# Patient Record
Sex: MALE | Race: WHITE | NOT HISPANIC OR LATINO | URBAN - METROPOLITAN AREA
[De-identification: names, ages, dates, MRNs, and addresses within clinical notes are randomized per-mention and may not be internally consistent; named-entity substitution may affect disease eponyms.]

---

## 2019-01-15 ENCOUNTER — HOSPITAL ENCOUNTER (OUTPATIENT)
Dept: RADIOLOGY | Facility: MEDICAL CENTER | Age: 74
DRG: 517 | End: 2019-01-15
Attending: NEUROLOGICAL SURGERY | Admitting: NEUROLOGICAL SURGERY
Payer: COMMERCIAL

## 2019-01-15 DIAGNOSIS — Z01.810 PRE-OPERATIVE CARDIOVASCULAR EXAMINATION: ICD-10-CM

## 2019-01-15 DIAGNOSIS — Z01.812 PRE-OPERATIVE LABORATORY EXAMINATION: ICD-10-CM

## 2019-01-15 DIAGNOSIS — Z01.811 PRE-OPERATIVE RESPIRATORY EXAMINATION: ICD-10-CM

## 2019-01-15 LAB
ANION GAP SERPL CALC-SCNC: 11 MMOL/L (ref 0–11.9)
APPEARANCE UR: CLEAR
APTT PPP: 33 SEC (ref 24.7–36)
BASOPHILS # BLD AUTO: 0.6 % (ref 0–1.8)
BASOPHILS # BLD: 0.04 K/UL (ref 0–0.12)
BILIRUB UR QL STRIP.AUTO: NEGATIVE
BUN SERPL-MCNC: 14 MG/DL (ref 8–22)
CALCIUM SERPL-MCNC: 10.2 MG/DL (ref 8.5–10.5)
CHLORIDE SERPL-SCNC: 104 MMOL/L (ref 96–112)
CO2 SERPL-SCNC: 24 MMOL/L (ref 20–33)
COLOR UR: YELLOW
CREAT SERPL-MCNC: 0.88 MG/DL (ref 0.5–1.4)
EKG IMPRESSION: NORMAL
EOSINOPHIL # BLD AUTO: 0.06 K/UL (ref 0–0.51)
EOSINOPHIL NFR BLD: 0.9 % (ref 0–6.9)
ERYTHROCYTE [DISTWIDTH] IN BLOOD BY AUTOMATED COUNT: 43 FL (ref 35.9–50)
GLUCOSE SERPL-MCNC: 93 MG/DL (ref 65–99)
GLUCOSE UR STRIP.AUTO-MCNC: NEGATIVE MG/DL
HCT VFR BLD AUTO: 51 % (ref 42–52)
HGB BLD-MCNC: 17.3 G/DL (ref 14–18)
IMM GRANULOCYTES # BLD AUTO: 0.02 K/UL (ref 0–0.11)
IMM GRANULOCYTES NFR BLD AUTO: 0.3 % (ref 0–0.9)
INR PPP: 0.93 (ref 0.87–1.13)
KETONES UR STRIP.AUTO-MCNC: NEGATIVE MG/DL
LEUKOCYTE ESTERASE UR QL STRIP.AUTO: NEGATIVE
LYMPHOCYTES # BLD AUTO: 1.61 K/UL (ref 1–4.8)
LYMPHOCYTES NFR BLD: 23.1 % (ref 22–41)
MCH RBC QN AUTO: 32.6 PG (ref 27–33)
MCHC RBC AUTO-ENTMCNC: 33.9 G/DL (ref 33.7–35.3)
MCV RBC AUTO: 96 FL (ref 81.4–97.8)
MICRO URNS: NORMAL
MONOCYTES # BLD AUTO: 0.71 K/UL (ref 0–0.85)
MONOCYTES NFR BLD AUTO: 10.2 % (ref 0–13.4)
NEUTROPHILS # BLD AUTO: 4.53 K/UL (ref 1.82–7.42)
NEUTROPHILS NFR BLD: 64.9 % (ref 44–72)
NITRITE UR QL STRIP.AUTO: NEGATIVE
NRBC # BLD AUTO: 0 K/UL
NRBC BLD-RTO: 0 /100 WBC
PH UR STRIP.AUTO: 7 [PH]
PLATELET # BLD AUTO: 181 K/UL (ref 164–446)
PMV BLD AUTO: 10.4 FL (ref 9–12.9)
POTASSIUM SERPL-SCNC: 4.2 MMOL/L (ref 3.6–5.5)
PROT UR QL STRIP: NEGATIVE MG/DL
PROTHROMBIN TIME: 12.6 SEC (ref 12–14.6)
RBC # BLD AUTO: 5.31 M/UL (ref 4.7–6.1)
RBC UR QL AUTO: NEGATIVE
SODIUM SERPL-SCNC: 139 MMOL/L (ref 135–145)
SP GR UR STRIP.AUTO: 1.02
UROBILINOGEN UR STRIP.AUTO-MCNC: 0.2 MG/DL
WBC # BLD AUTO: 7 K/UL (ref 4.8–10.8)

## 2019-01-15 PROCEDURE — 85025 COMPLETE CBC W/AUTO DIFF WBC: CPT

## 2019-01-15 PROCEDURE — 85730 THROMBOPLASTIN TIME PARTIAL: CPT

## 2019-01-15 PROCEDURE — 71045 X-RAY EXAM CHEST 1 VIEW: CPT

## 2019-01-15 PROCEDURE — 81003 URINALYSIS AUTO W/O SCOPE: CPT

## 2019-01-15 PROCEDURE — 93005 ELECTROCARDIOGRAM TRACING: CPT

## 2019-01-15 PROCEDURE — 36415 COLL VENOUS BLD VENIPUNCTURE: CPT

## 2019-01-15 PROCEDURE — 93010 ELECTROCARDIOGRAM REPORT: CPT | Performed by: INTERNAL MEDICINE

## 2019-01-15 PROCEDURE — 80048 BASIC METABOLIC PNL TOTAL CA: CPT

## 2019-01-15 PROCEDURE — 85610 PROTHROMBIN TIME: CPT

## 2019-01-15 RX ORDER — ATENOLOL 25 MG/1
25 TABLET ORAL DAILY
COMMUNITY

## 2019-01-15 RX ORDER — IBUPROFEN 800 MG/1
800 TABLET ORAL EVERY 8 HOURS PRN
Status: ON HOLD | COMMUNITY
End: 2019-01-27

## 2019-01-15 RX ORDER — PREDNISONE 5 MG/1
5 TABLET ORAL DAILY
COMMUNITY

## 2019-01-22 ENCOUNTER — APPOINTMENT (OUTPATIENT)
Dept: RADIOLOGY | Facility: MEDICAL CENTER | Age: 74
DRG: 517 | End: 2019-01-22
Attending: NEUROLOGICAL SURGERY
Payer: COMMERCIAL

## 2019-01-22 ENCOUNTER — HOSPITAL ENCOUNTER (INPATIENT)
Facility: MEDICAL CENTER | Age: 74
LOS: 5 days | DRG: 517 | End: 2019-01-27
Attending: NEUROLOGICAL SURGERY | Admitting: NEUROLOGICAL SURGERY
Payer: COMMERCIAL

## 2019-01-22 DIAGNOSIS — M54.5 BILATERAL LOW BACK PAIN, UNSPECIFIED CHRONICITY, WITH SCIATICA PRESENCE UNSPECIFIED: ICD-10-CM

## 2019-01-22 PROCEDURE — 160002 HCHG RECOVERY MINUTES (STAT): Performed by: NEUROLOGICAL SURGERY

## 2019-01-22 PROCEDURE — 72020 X-RAY EXAM OF SPINE 1 VIEW: CPT

## 2019-01-22 PROCEDURE — 700102 HCHG RX REV CODE 250 W/ 637 OVERRIDE(OP): Performed by: PHYSICIAN ASSISTANT

## 2019-01-22 PROCEDURE — 700101 HCHG RX REV CODE 250

## 2019-01-22 PROCEDURE — 700102 HCHG RX REV CODE 250 W/ 637 OVERRIDE(OP): Performed by: ANESTHESIOLOGY

## 2019-01-22 PROCEDURE — 110454 HCHG SHELL REV 250: Performed by: NEUROLOGICAL SURGERY

## 2019-01-22 PROCEDURE — 160048 HCHG OR STATISTICAL LEVEL 1-5: Performed by: NEUROLOGICAL SURGERY

## 2019-01-22 PROCEDURE — 770001 HCHG ROOM/CARE - MED/SURG/GYN PRIV*

## 2019-01-22 PROCEDURE — 160029 HCHG SURGERY MINUTES - 1ST 30 MINS LEVEL 4: Performed by: NEUROLOGICAL SURGERY

## 2019-01-22 PROCEDURE — 95955 EEG DURING SURGERY: CPT | Performed by: NEUROLOGICAL SURGERY

## 2019-01-22 PROCEDURE — 501838 HCHG SUTURE GENERAL: Performed by: NEUROLOGICAL SURGERY

## 2019-01-22 PROCEDURE — 700112 HCHG RX REV CODE 229: Performed by: PHYSICIAN ASSISTANT

## 2019-01-22 PROCEDURE — 4A1134G MONITORING OF PERIPHERAL NERVOUS ELECTRICAL ACTIVITY, INTRAOPERATIVE, PERCUTANEOUS APPROACH: ICD-10-PCS | Performed by: NEUROLOGICAL SURGERY

## 2019-01-22 PROCEDURE — 95861 NEEDLE EMG 2 EXTREMITIES: CPT | Performed by: NEUROLOGICAL SURGERY

## 2019-01-22 PROCEDURE — 95940 IONM IN OPERATNG ROOM 15 MIN: CPT | Performed by: NEUROLOGICAL SURGERY

## 2019-01-22 PROCEDURE — A9270 NON-COVERED ITEM OR SERVICE: HCPCS | Performed by: PHYSICIAN ASSISTANT

## 2019-01-22 PROCEDURE — 700101 HCHG RX REV CODE 250: Performed by: PHYSICIAN ASSISTANT

## 2019-01-22 PROCEDURE — 160035 HCHG PACU - 1ST 60 MINS PHASE I: Performed by: NEUROLOGICAL SURGERY

## 2019-01-22 PROCEDURE — 01NB0ZZ RELEASE LUMBAR NERVE, OPEN APPROACH: ICD-10-PCS | Performed by: NEUROLOGICAL SURGERY

## 2019-01-22 PROCEDURE — 700111 HCHG RX REV CODE 636 W/ 250 OVERRIDE (IP): Performed by: PHYSICIAN ASSISTANT

## 2019-01-22 PROCEDURE — 95925 SOMATOSENSORY TESTING: CPT | Performed by: NEUROLOGICAL SURGERY

## 2019-01-22 PROCEDURE — 160009 HCHG ANES TIME/MIN: Performed by: NEUROLOGICAL SURGERY

## 2019-01-22 PROCEDURE — A9270 NON-COVERED ITEM OR SERVICE: HCPCS | Performed by: ANESTHESIOLOGY

## 2019-01-22 PROCEDURE — 500367 HCHG DRAIN KIT, HEMOVAC: Performed by: NEUROLOGICAL SURGERY

## 2019-01-22 PROCEDURE — 95937 NEUROMUSCULAR JUNCTION TEST: CPT | Performed by: NEUROLOGICAL SURGERY

## 2019-01-22 PROCEDURE — 160036 HCHG PACU - EA ADDL 30 MINS PHASE I: Performed by: NEUROLOGICAL SURGERY

## 2019-01-22 PROCEDURE — 700111 HCHG RX REV CODE 636 W/ 250 OVERRIDE (IP)

## 2019-01-22 PROCEDURE — 160041 HCHG SURGERY MINUTES - EA ADDL 1 MIN LEVEL 4: Performed by: NEUROLOGICAL SURGERY

## 2019-01-22 PROCEDURE — 500885 HCHG PACK, JACKSON TABLE: Performed by: NEUROLOGICAL SURGERY

## 2019-01-22 RX ORDER — DIPHENHYDRAMINE HYDROCHLORIDE 50 MG/ML
12.5 INJECTION INTRAMUSCULAR; INTRAVENOUS
Status: DISCONTINUED | OUTPATIENT
Start: 2019-01-22 | End: 2019-01-22 | Stop reason: HOSPADM

## 2019-01-22 RX ORDER — DEXTROSE MONOHYDRATE, SODIUM CHLORIDE, AND POTASSIUM CHLORIDE 50; 1.49; 4.5 G/1000ML; G/1000ML; G/1000ML
INJECTION, SOLUTION INTRAVENOUS CONTINUOUS
Status: DISCONTINUED | OUTPATIENT
Start: 2019-01-22 | End: 2019-01-27 | Stop reason: HOSPADM

## 2019-01-22 RX ORDER — DIPHENHYDRAMINE HYDROCHLORIDE 50 MG/ML
25 INJECTION INTRAMUSCULAR; INTRAVENOUS EVERY 6 HOURS PRN
Status: DISCONTINUED | OUTPATIENT
Start: 2019-01-22 | End: 2019-01-27 | Stop reason: HOSPADM

## 2019-01-22 RX ORDER — ONDANSETRON 2 MG/ML
4 INJECTION INTRAMUSCULAR; INTRAVENOUS EVERY 4 HOURS PRN
Status: DISCONTINUED | OUTPATIENT
Start: 2019-01-22 | End: 2019-01-27 | Stop reason: HOSPADM

## 2019-01-22 RX ORDER — HYDROMORPHONE HYDROCHLORIDE 1 MG/ML
0.2 INJECTION, SOLUTION INTRAMUSCULAR; INTRAVENOUS; SUBCUTANEOUS
Status: DISCONTINUED | OUTPATIENT
Start: 2019-01-22 | End: 2019-01-22 | Stop reason: HOSPADM

## 2019-01-22 RX ORDER — HYDRALAZINE HYDROCHLORIDE 20 MG/ML
5 INJECTION INTRAMUSCULAR; INTRAVENOUS
Status: DISCONTINUED | OUTPATIENT
Start: 2019-01-22 | End: 2019-01-22 | Stop reason: HOSPADM

## 2019-01-22 RX ORDER — ATENOLOL 25 MG/1
25 TABLET ORAL DAILY
Status: DISCONTINUED | OUTPATIENT
Start: 2019-01-23 | End: 2019-01-27 | Stop reason: HOSPADM

## 2019-01-22 RX ORDER — DIPHENHYDRAMINE HCL 25 MG
25 TABLET ORAL EVERY 6 HOURS PRN
Status: DISCONTINUED | OUTPATIENT
Start: 2019-01-22 | End: 2019-01-27 | Stop reason: HOSPADM

## 2019-01-22 RX ORDER — HYDROMORPHONE HYDROCHLORIDE 1 MG/ML
0.4 INJECTION, SOLUTION INTRAMUSCULAR; INTRAVENOUS; SUBCUTANEOUS
Status: DISCONTINUED | OUTPATIENT
Start: 2019-01-22 | End: 2019-01-22 | Stop reason: HOSPADM

## 2019-01-22 RX ORDER — PREDNISONE 5 MG/1
5 TABLET ORAL DAILY
Status: DISCONTINUED | OUTPATIENT
Start: 2019-01-23 | End: 2019-01-27 | Stop reason: HOSPADM

## 2019-01-22 RX ORDER — SODIUM CHLORIDE, SODIUM LACTATE, POTASSIUM CHLORIDE, CALCIUM CHLORIDE 600; 310; 30; 20 MG/100ML; MG/100ML; MG/100ML; MG/100ML
INJECTION, SOLUTION INTRAVENOUS CONTINUOUS
Status: DISCONTINUED | OUTPATIENT
Start: 2019-01-22 | End: 2019-01-22 | Stop reason: HOSPADM

## 2019-01-22 RX ORDER — SODIUM CHLORIDE, SODIUM LACTATE, POTASSIUM CHLORIDE, CALCIUM CHLORIDE 600; 310; 30; 20 MG/100ML; MG/100ML; MG/100ML; MG/100ML
INJECTION, SOLUTION INTRAVENOUS ONCE
Status: COMPLETED | OUTPATIENT
Start: 2019-01-22 | End: 2019-01-22

## 2019-01-22 RX ORDER — HYDROMORPHONE HYDROCHLORIDE 1 MG/ML
0.1 INJECTION, SOLUTION INTRAMUSCULAR; INTRAVENOUS; SUBCUTANEOUS
Status: DISCONTINUED | OUTPATIENT
Start: 2019-01-22 | End: 2019-01-22 | Stop reason: HOSPADM

## 2019-01-22 RX ORDER — BUPIVACAINE HYDROCHLORIDE AND EPINEPHRINE 5; 5 MG/ML; UG/ML
INJECTION, SOLUTION EPIDURAL; INTRACAUDAL; PERINEURAL
Status: DISCONTINUED | OUTPATIENT
Start: 2019-01-22 | End: 2019-01-22 | Stop reason: HOSPADM

## 2019-01-22 RX ORDER — POLYETHYLENE GLYCOL 3350 17 G/17G
1 POWDER, FOR SOLUTION ORAL 2 TIMES DAILY PRN
Status: DISCONTINUED | OUTPATIENT
Start: 2019-01-22 | End: 2019-01-27 | Stop reason: HOSPADM

## 2019-01-22 RX ORDER — ONDANSETRON 4 MG/1
4 TABLET, ORALLY DISINTEGRATING ORAL EVERY 4 HOURS PRN
Status: DISCONTINUED | OUTPATIENT
Start: 2019-01-22 | End: 2019-01-27 | Stop reason: HOSPADM

## 2019-01-22 RX ORDER — HYDROMORPHONE HYDROCHLORIDE 2 MG/ML
1 INJECTION, SOLUTION INTRAMUSCULAR; INTRAVENOUS; SUBCUTANEOUS
Status: DISCONTINUED | OUTPATIENT
Start: 2019-01-22 | End: 2019-01-27 | Stop reason: HOSPADM

## 2019-01-22 RX ORDER — OXYCODONE HCL 5 MG/5 ML
10 SOLUTION, ORAL ORAL
Status: DISCONTINUED | OUTPATIENT
Start: 2019-01-22 | End: 2019-01-22 | Stop reason: HOSPADM

## 2019-01-22 RX ORDER — OXYCODONE HCL 5 MG/5 ML
5 SOLUTION, ORAL ORAL
Status: DISCONTINUED | OUTPATIENT
Start: 2019-01-22 | End: 2019-01-22 | Stop reason: HOSPADM

## 2019-01-22 RX ORDER — ONDANSETRON 2 MG/ML
4 INJECTION INTRAMUSCULAR; INTRAVENOUS
Status: DISCONTINUED | OUTPATIENT
Start: 2019-01-22 | End: 2019-01-22 | Stop reason: HOSPADM

## 2019-01-22 RX ORDER — CELECOXIB 200 MG/1
200 CAPSULE ORAL ONCE
Status: COMPLETED | OUTPATIENT
Start: 2019-01-22 | End: 2019-01-22

## 2019-01-22 RX ORDER — ACETAMINOPHEN 500 MG
1000 TABLET ORAL ONCE
Status: COMPLETED | OUTPATIENT
Start: 2019-01-22 | End: 2019-01-22

## 2019-01-22 RX ORDER — AMOXICILLIN 250 MG
1 CAPSULE ORAL
Status: DISCONTINUED | OUTPATIENT
Start: 2019-01-22 | End: 2019-01-27 | Stop reason: HOSPADM

## 2019-01-22 RX ORDER — AMOXICILLIN 250 MG
1 CAPSULE ORAL NIGHTLY
Status: DISCONTINUED | OUTPATIENT
Start: 2019-01-22 | End: 2019-01-27 | Stop reason: HOSPADM

## 2019-01-22 RX ORDER — OXYCODONE HYDROCHLORIDE AND ACETAMINOPHEN 5; 325 MG/1; MG/1
1-2 TABLET ORAL EVERY 4 HOURS PRN
Status: DISCONTINUED | OUTPATIENT
Start: 2019-01-22 | End: 2019-01-27 | Stop reason: HOSPADM

## 2019-01-22 RX ORDER — CYCLOBENZAPRINE HCL 10 MG
10 TABLET ORAL EVERY 8 HOURS PRN
Status: DISCONTINUED | OUTPATIENT
Start: 2019-01-22 | End: 2019-01-23

## 2019-01-22 RX ORDER — SODIUM CHLORIDE, SODIUM LACTATE, POTASSIUM CHLORIDE, AND CALCIUM CHLORIDE .6; .31; .03; .02 G/100ML; G/100ML; G/100ML; G/100ML
IRRIGANT IRRIGATION
Status: DISCONTINUED | OUTPATIENT
Start: 2019-01-22 | End: 2019-01-22 | Stop reason: HOSPADM

## 2019-01-22 RX ORDER — MEPERIDINE HYDROCHLORIDE 25 MG/ML
6.25 INJECTION INTRAMUSCULAR; INTRAVENOUS; SUBCUTANEOUS
Status: DISCONTINUED | OUTPATIENT
Start: 2019-01-22 | End: 2019-01-22 | Stop reason: HOSPADM

## 2019-01-22 RX ORDER — CEFAZOLIN SODIUM 2 G/100ML
2 INJECTION, SOLUTION INTRAVENOUS EVERY 8 HOURS
Status: COMPLETED | OUTPATIENT
Start: 2019-01-22 | End: 2019-01-23

## 2019-01-22 RX ORDER — HALOPERIDOL 5 MG/ML
1 INJECTION INTRAMUSCULAR
Status: DISCONTINUED | OUTPATIENT
Start: 2019-01-22 | End: 2019-01-22 | Stop reason: HOSPADM

## 2019-01-22 RX ORDER — HYDRALAZINE HYDROCHLORIDE 20 MG/ML
10 INJECTION INTRAMUSCULAR; INTRAVENOUS
Status: DISCONTINUED | OUTPATIENT
Start: 2019-01-22 | End: 2019-01-27 | Stop reason: HOSPADM

## 2019-01-22 RX ORDER — ENEMA 19; 7 G/133ML; G/133ML
1 ENEMA RECTAL
Status: DISCONTINUED | OUTPATIENT
Start: 2019-01-22 | End: 2019-01-27 | Stop reason: HOSPADM

## 2019-01-22 RX ORDER — ALPRAZOLAM 0.25 MG/1
0.25 TABLET ORAL 2 TIMES DAILY PRN
Status: DISCONTINUED | OUTPATIENT
Start: 2019-01-22 | End: 2019-01-27 | Stop reason: HOSPADM

## 2019-01-22 RX ORDER — HYDROCODONE BITARTRATE AND ACETAMINOPHEN 10; 325 MG/1; MG/1
1-2 TABLET ORAL EVERY 4 HOURS PRN
Status: DISCONTINUED | OUTPATIENT
Start: 2019-01-22 | End: 2019-01-27 | Stop reason: HOSPADM

## 2019-01-22 RX ORDER — CALCIUM CARBONATE 500 MG/1
500 TABLET, CHEWABLE ORAL 2 TIMES DAILY
Status: DISCONTINUED | OUTPATIENT
Start: 2019-01-22 | End: 2019-01-27 | Stop reason: HOSPADM

## 2019-01-22 RX ORDER — DOCUSATE SODIUM 100 MG/1
100 CAPSULE, LIQUID FILLED ORAL 2 TIMES DAILY
Status: DISCONTINUED | OUTPATIENT
Start: 2019-01-22 | End: 2019-01-27 | Stop reason: HOSPADM

## 2019-01-22 RX ORDER — BISACODYL 10 MG
10 SUPPOSITORY, RECTAL RECTAL
Status: DISCONTINUED | OUTPATIENT
Start: 2019-01-22 | End: 2019-01-27 | Stop reason: HOSPADM

## 2019-01-22 RX ADMIN — ACETAMINOPHEN 1000 MG: 500 TABLET, FILM COATED ORAL at 10:21

## 2019-01-22 RX ADMIN — ANTACID TABLETS 500 MG: 500 TABLET, CHEWABLE ORAL at 17:38

## 2019-01-22 RX ADMIN — CEFAZOLIN SODIUM 2 G: 2 INJECTION, SOLUTION INTRAVENOUS at 17:37

## 2019-01-22 RX ADMIN — POTASSIUM CHLORIDE, DEXTROSE MONOHYDRATE AND SODIUM CHLORIDE: 150; 5; 450 INJECTION, SOLUTION INTRAVENOUS at 17:37

## 2019-01-22 RX ADMIN — CELECOXIB 200 MG: 200 CAPSULE ORAL at 10:21

## 2019-01-22 RX ADMIN — SODIUM CHLORIDE, SODIUM LACTATE, POTASSIUM CHLORIDE, CALCIUM CHLORIDE: 600; 310; 30; 20 INJECTION, SOLUTION INTRAVENOUS at 15:19

## 2019-01-22 RX ADMIN — Medication 1 TABLET: at 21:40

## 2019-01-22 RX ADMIN — SODIUM CHLORIDE, SODIUM LACTATE, POTASSIUM CHLORIDE, CALCIUM CHLORIDE: 600; 310; 30; 20 INJECTION, SOLUTION INTRAVENOUS at 10:21

## 2019-01-22 RX ADMIN — DOCUSATE SODIUM 100 MG: 100 CAPSULE, LIQUID FILLED ORAL at 17:37

## 2019-01-22 ASSESSMENT — PAIN SCALES - GENERAL
PAINLEVEL_OUTOF10: 0
PAINLEVEL_OUTOF10: 0
PAINLEVEL_OUTOF10: 4
PAINLEVEL_OUTOF10: 0

## 2019-01-22 ASSESSMENT — PATIENT HEALTH QUESTIONNAIRE - PHQ9
1. LITTLE INTEREST OR PLEASURE IN DOING THINGS: NOT AT ALL
SUM OF ALL RESPONSES TO PHQ9 QUESTIONS 1 AND 2: 0
2. FEELING DOWN, DEPRESSED, IRRITABLE, OR HOPELESS: NOT AT ALL

## 2019-01-22 NOTE — OP REPORT
DATE OF SERVICE:  01/22/2019    PREOPERATIVE DIAGNOSES:  1.  L2-S1 lumbar stenosis with bilateral recess stenosis.  2.  Neurogenic claudication.  3.  Failed conservative care.    POSTOPERATIVE DIAGNOSES:  1.  L2-S1 lumbar stenosis with bilateral recess stenosis.  2.  Neurogenic claudication.  3.  Failed conservative care.    PROCEDURES:  1.  L2 through S1 decompressive lumbar laminectomies with bilateral   foraminotomies.  2.  Left L4 transpedicular approach far lateral decompression, left L4 nerve   root.  3.  Left L5 transpedicular approach far lateral decompression, left L5 nerve   root.  4.  Microscope for microdissection of spinal canal.  5.  SSEPs and EMG monitoring performed by [facility], which   remained stable throughout.    SURGEON:  Terrence Gabriel MD, neurosurgery-spine surgery    ASSISTANT:  Maximus Winslow PA-C    ANESTHESIA:  General endotracheal anesthesia.    ANESTHESIOLOGIST:  Nemesio Bullock MD    COMPLICATIONS:  None.    ESTIMATED BLOOD LOSS:  Less than 100 mL    PREOPERATIVE NOTE:  This is a very pleasant 74-year-old male who presents with   neurogenic claudication.  An MRI showed severe lumbar stenosis L2 to L5-S1   levels with marked facet and ligamentous hypertrophy.  Given the patient's   failure to improve with conservative care, I have offered the patient the   above-listed procedure.  Details are well contained in the office visit notes.    NARRATIVE DICTATION:  Patient was brought to the operating room and placed   under general endotracheal anesthesia.  He was placed prone on the operating   OSI table with care taken about the bony prominences and peripheral nerves.    Lumbar region was prepped and draped in usual sterile fashion.  Following   localization with cross table fluoroscopy, a midline incision was made from   L2-L5 and the dorsal elements of L2-L5 were exposed in a subperiosteal fashion   at the facets bilaterally.  Self-retaining retraction applied.     Intraoperative x-ray confirmed appropriate levels.  Using a Midas Baltazar AM-8   drillbit, Kerrison rongeurs and curettes, decompressive lumbar laminectomies   of L2, L3, L4, L5, and S1 were completed.  Marked facet and ligamentous   hypertrophy was undercut and removed.  There was severe stenosis at L4-L5,   which was carefully microdissected off the dura and undercut and removed.  The   microscope was used for microdissection of spinal canal throughout.  There   was severe stenosis on the left side; hence, I drilled down the left L4   pedicle and the left L5 pedicle for transpedicular decompression of left L4   and L5 nerve roots respectively.  This required extra degree of expertise,   effort and time; hence, modifier-59 was required for separate and distinct   procedures for CPT code 79764-36535.  The edges of bone were bone waxed.    Thrombin Gelfoam placed in epidural gutters for hemostasis.  Del Rosario ball hook   was easily placed over the exiting nerve roots.    The wound was irrigated and then I placed an epidural catheter with Marcaine   and fentanyl for postoperative analgesia.  I infiltrated the muscle with   Marcaine analgesia and closed the wound over a drain, brought out through a   separate incision with 0 Vicryl to deep fascia, 2-0 Vicryl to deep dermal   layer, Steri-Strips to skin.  Small sterile dressing was applied.  Swabs,   needles, instruments correct x2 count.  No complications were encountered.    Patient tolerated the procedure, was stable and transferred to recovery room.    Patient will be observed at [facility] until he meets   discharge criteria over the next several days.    INTRAOPERATIVE FINDINGS:  Severe stenosis L2 to L5-S1 levels with marked facet   and ligamentous hypertrophy and severe stenosis at L4-L5.  This was undercut   and removed extensively to free up the L2-L5 nerve roots throughout.  The   patient was stable in recovery room.    Patient will follow up  at [facility] as instructed.       ____________________________________     MD TAYLOR JOINER / SMILEY    DD:  01/22/2019 14:22:09  DT:  01/22/2019 14:37:15    D#:  [id number]  Job#:  [id number]

## 2019-01-22 NOTE — OR SURGEON
Immediate Post OP Note    PreOp Diagnosis: L2-5 DDD, radiculopathy.     PostOp Diagnosis: Same.     Procedure(s):  LUMBAR LAMINECTOMY DISKECTOMY- L2-5 - Wound Class: Clean with Drain    Surgeon(s):  Terrence Gabriel M.D.    Anesthesiologist/Type of Anesthesia:  Anesthesiologist: Nemesio Bullock M.D./General    Surgical Staff:  Assistant: Maximus Winslow P.A.-C.  Circulator: Mildred Larios R.N.  Relief Circulator: Ana Jimenez R.N.  Relief Scrub: Ingrid Hammonds  Scrub Person: Quan Shi  Radiology Technologist: Luanne Don    Specimens removed if any:  * No specimens in log *    Estimated Blood Loss: <100 cc     Findings: L2-5 DDD, see dictation.     Complications: None.          1/22/2019 1:45 PM Maximus Winslow P.A.-C.

## 2019-01-22 NOTE — OR NURSING
VSS.  Oximetry WNL on 2L nasal cannula.  Lumbar dressing with mild amount of sanguinous drainage.  Hemovac with mild bloody output.  Neurologically intact.  ALTAMIRANO strong = bilat.  Denies numbness / tingling.  Awaiting ready room

## 2019-01-23 LAB
ANION GAP SERPL CALC-SCNC: 9 MMOL/L (ref 0–11.9)
BUN SERPL-MCNC: 13 MG/DL (ref 8–22)
CALCIUM SERPL-MCNC: 8.8 MG/DL (ref 8.5–10.5)
CHLORIDE SERPL-SCNC: 104 MMOL/L (ref 96–112)
CO2 SERPL-SCNC: 25 MMOL/L (ref 20–33)
CREAT SERPL-MCNC: 0.85 MG/DL (ref 0.5–1.4)
ERYTHROCYTE [DISTWIDTH] IN BLOOD BY AUTOMATED COUNT: 43.5 FL (ref 35.9–50)
GLUCOSE SERPL-MCNC: 154 MG/DL (ref 65–99)
HCT VFR BLD AUTO: 41.8 % (ref 42–52)
HGB BLD-MCNC: 13.8 G/DL (ref 14–18)
MCH RBC QN AUTO: 32.5 PG (ref 27–33)
MCHC RBC AUTO-ENTMCNC: 33 G/DL (ref 33.7–35.3)
MCV RBC AUTO: 98.4 FL (ref 81.4–97.8)
PLATELET # BLD AUTO: 186 K/UL (ref 164–446)
PMV BLD AUTO: 10.6 FL (ref 9–12.9)
POTASSIUM SERPL-SCNC: 4.8 MMOL/L (ref 3.6–5.5)
RBC # BLD AUTO: 4.25 M/UL (ref 4.7–6.1)
SODIUM SERPL-SCNC: 138 MMOL/L (ref 135–145)
WBC # BLD AUTO: 10.8 K/UL (ref 4.8–10.8)

## 2019-01-23 PROCEDURE — 700111 HCHG RX REV CODE 636 W/ 250 OVERRIDE (IP): Performed by: PHYSICIAN ASSISTANT

## 2019-01-23 PROCEDURE — 36415 COLL VENOUS BLD VENIPUNCTURE: CPT

## 2019-01-23 PROCEDURE — 97161 PT EVAL LOW COMPLEX 20 MIN: CPT

## 2019-01-23 PROCEDURE — A9270 NON-COVERED ITEM OR SERVICE: HCPCS | Performed by: PHYSICIAN ASSISTANT

## 2019-01-23 PROCEDURE — 700101 HCHG RX REV CODE 250: Performed by: PHYSICIAN ASSISTANT

## 2019-01-23 PROCEDURE — 700102 HCHG RX REV CODE 250 W/ 637 OVERRIDE(OP): Performed by: PHYSICIAN ASSISTANT

## 2019-01-23 PROCEDURE — 700112 HCHG RX REV CODE 229: Performed by: PHYSICIAN ASSISTANT

## 2019-01-23 PROCEDURE — 770001 HCHG ROOM/CARE - MED/SURG/GYN PRIV*

## 2019-01-23 PROCEDURE — 80048 BASIC METABOLIC PNL TOTAL CA: CPT

## 2019-01-23 PROCEDURE — 85027 COMPLETE CBC AUTOMATED: CPT

## 2019-01-23 PROCEDURE — 97165 OT EVAL LOW COMPLEX 30 MIN: CPT

## 2019-01-23 RX ORDER — BUTALBITAL, ACETAMINOPHEN AND CAFFEINE 50; 325; 40 MG/1; MG/1; MG/1
1 TABLET ORAL EVERY 6 HOURS PRN
Status: DISCONTINUED | OUTPATIENT
Start: 2019-01-23 | End: 2019-01-27 | Stop reason: HOSPADM

## 2019-01-23 RX ORDER — ACETAMINOPHEN 500 MG
500 TABLET ORAL EVERY 4 HOURS PRN
Status: DISCONTINUED | OUTPATIENT
Start: 2019-01-23 | End: 2019-01-27 | Stop reason: HOSPADM

## 2019-01-23 RX ORDER — CYCLOBENZAPRINE HCL 10 MG
5 TABLET ORAL EVERY 8 HOURS PRN
Status: DISCONTINUED | OUTPATIENT
Start: 2019-01-23 | End: 2019-01-27 | Stop reason: HOSPADM

## 2019-01-23 RX ORDER — SCOLOPAMINE TRANSDERMAL SYSTEM 1 MG/1
1 PATCH, EXTENDED RELEASE TRANSDERMAL
Status: DISCONTINUED | OUTPATIENT
Start: 2019-01-23 | End: 2019-01-27 | Stop reason: HOSPADM

## 2019-01-23 RX ORDER — METOCLOPRAMIDE HYDROCHLORIDE 5 MG/ML
5 INJECTION INTRAMUSCULAR; INTRAVENOUS EVERY 12 HOURS PRN
Status: DISCONTINUED | OUTPATIENT
Start: 2019-01-23 | End: 2019-01-27 | Stop reason: HOSPADM

## 2019-01-23 RX ADMIN — METOCLOPRAMIDE 5 MG: 5 INJECTION, SOLUTION INTRAMUSCULAR; INTRAVENOUS at 23:23

## 2019-01-23 RX ADMIN — POTASSIUM CHLORIDE, DEXTROSE MONOHYDRATE AND SODIUM CHLORIDE: 150; 5; 450 INJECTION, SOLUTION INTRAVENOUS at 05:26

## 2019-01-23 RX ADMIN — PREDNISONE 5 MG: 5 TABLET ORAL at 05:22

## 2019-01-23 RX ADMIN — ATENOLOL 25 MG: 25 TABLET ORAL at 05:22

## 2019-01-23 RX ADMIN — HYDROMORPHONE HYDROCHLORIDE 1 MG: 2 INJECTION INTRAMUSCULAR; INTRAVENOUS; SUBCUTANEOUS at 18:02

## 2019-01-23 RX ADMIN — CEFAZOLIN SODIUM 2 G: 2 INJECTION, SOLUTION INTRAVENOUS at 03:04

## 2019-01-23 RX ADMIN — ONDANSETRON 4 MG: 2 INJECTION INTRAMUSCULAR; INTRAVENOUS at 17:26

## 2019-01-23 RX ADMIN — BUTALBITAL, ACETAMINOPHEN, AND CAFFEINE 1 TABLET: 50; 325; 40 TABLET ORAL at 21:01

## 2019-01-23 RX ADMIN — SCOPALAMINE 1 PATCH: 1 PATCH, EXTENDED RELEASE TRANSDERMAL at 22:17

## 2019-01-23 RX ADMIN — DOCUSATE SODIUM 100 MG: 100 CAPSULE, LIQUID FILLED ORAL at 05:22

## 2019-01-23 RX ADMIN — ANTACID TABLETS 500 MG: 500 TABLET, CHEWABLE ORAL at 05:21

## 2019-01-23 ASSESSMENT — COGNITIVE AND FUNCTIONAL STATUS - GENERAL
STANDING UP FROM CHAIR USING ARMS: A LITTLE
MOVING TO AND FROM BED TO CHAIR: A LITTLE
MOVING TO AND FROM BED TO CHAIR: A LITTLE
TURNING FROM BACK TO SIDE WHILE IN FLAT BAD: A LITTLE
HELP NEEDED FOR BATHING: A LITTLE
DAILY ACTIVITIY SCORE: 23
SUGGESTED CMS G CODE MODIFIER DAILY ACTIVITY: CI
MOBILITY SCORE: 19
SUGGESTED CMS G CODE MODIFIER MOBILITY: CK
MOVING FROM LYING ON BACK TO SITTING ON SIDE OF FLAT BED: A LITTLE
CLIMB 3 TO 5 STEPS WITH RAILING: A LITTLE
TOILETING: A LITTLE
STANDING UP FROM CHAIR USING ARMS: A LITTLE
SUGGESTED CMS G CODE MODIFIER DAILY ACTIVITY: CI
WALKING IN HOSPITAL ROOM: A LITTLE
SUGGESTED CMS G CODE MODIFIER MOBILITY: CK
MOBILITY SCORE: 19
CLIMB 3 TO 5 STEPS WITH RAILING: A LITTLE
DAILY ACTIVITIY SCORE: 23
WALKING IN HOSPITAL ROOM: A LITTLE

## 2019-01-23 ASSESSMENT — LIFESTYLE VARIABLES
EVER HAD A DRINK FIRST THING IN THE MORNING TO STEADY YOUR NERVES TO GET RID OF A HANGOVER: NO
HOW MANY TIMES IN THE PAST YEAR HAVE YOU HAD 5 OR MORE DRINKS IN A DAY: 0
TOTAL SCORE: 0
HAVE YOU EVER FELT YOU SHOULD CUT DOWN ON YOUR DRINKING: NO
CONSUMPTION TOTAL: NEGATIVE
ALCOHOL_USE: YES
AVERAGE NUMBER OF DAYS PER WEEK YOU HAVE A DRINK CONTAINING ALCOHOL: 2
HAVE PEOPLE ANNOYED YOU BY CRITICIZING YOUR DRINKING: NO
ON A TYPICAL DAY WHEN YOU DRINK ALCOHOL HOW MANY DRINKS DO YOU HAVE: 1
EVER FELT BAD OR GUILTY ABOUT YOUR DRINKING: NO

## 2019-01-23 ASSESSMENT — GAIT ASSESSMENTS
GAIT LEVEL OF ASSIST: MINIMAL ASSIST
ASSISTIVE DEVICE: FRONT WHEEL WALKER
DISTANCE (FEET): 350

## 2019-01-23 ASSESSMENT — ACTIVITIES OF DAILY LIVING (ADL): TOILETING: INDEPENDENT

## 2019-01-23 NOTE — CARE PLAN
Problem: Safety  Goal: Will remain free from falls  Outcome: PROGRESSING AS EXPECTED  Fall risk assessed using yee ko scale.   Bed alarm armed, call light within reach, treaded socks on.     Problem: Infection  Goal: Will remain free from infection  Outcome: PROGRESSING AS EXPECTED  Educated on signs of infection. MEWS and VS per hospital policy.     Problem: Respiratory:  Goal: Respiratory status will improve  Outcome: PROGRESSING AS EXPECTED  Currently on 2L O2, will titrate throughout shift,  monitoring in place.   Pulling 2000 with IS, demonstrated proper use of device.

## 2019-01-23 NOTE — DISCHARGE PLANNING
Anticipated Discharge Disposition:   Home in a motor home    Action:   Has friend who can help.   Normally does not use any assistive device.  Chart review: OT has no AVS but PT recommends outpt PT.   Discussed recommendation of PT, [facility]  Assessment completed.     Wendy ty [facility] at [phone] is available to assist with discharge planning needs.     Barriers to Discharge:   Surgical clearance    Plan:   RN kindly notify CM if there are any discharge concerns.

## 2019-01-23 NOTE — PROGRESS NOTES
Received report and assumed pt care.  A&O x4.  Bed alarm and treaded socks on.  Call light and personal belongings within reach.  Bed locked and at lowest position.  ALTAMIRANO.  VSS.  Hemovac in place.

## 2019-01-23 NOTE — PROGRESS NOTES
Neurosurgery Progress Note    Subjective:  POD#1 L2 - L5 laminectomies. Doing well. Back pain as expected. No lower extremity radicular pain.     Exam:  Back dry.  Motor 5/5  Sensory intact.    BP  Min: 81/55  Max: 147/95  Pulse  Av.1  Min: 65  Max: 79  Resp  Av.7  Min: 10  Max: 20  Temp  Av.4 °C (97.5 °F)  Min: 35.8 °C (96.5 °F)  Max: 36.8 °C (98.2 °F)  SpO2  Av.4 %  Min: 92 %  Max: 99 %    No Data Recorded    Recent Labs      19   0233   WBC  10.8   RBC  4.25*   HEMOGLOBIN  13.8*   HEMATOCRIT  41.8*   MCV  98.4*   MCH  32.5   MCHC  33.0*   RDW  43.5   PLATELETCT  186   MPV  10.6     Recent Labs      19   0233   SODIUM  138   POTASSIUM  4.8   CHLORIDE  104   CO2  25   GLUCOSE  154*   BUN  13   CREATININE  0.85   CALCIUM  8.8               Intake/Output       19 0700 - 19 0659 19 0700 - 19 0659      1882-2943 5350-2878 Total 4058-2624 3010-7262 Total       Intake    P.O.  500  -- 500  500  -- 500    P.O. 500 -- 500 500 -- 500    I.V.  2150  -- 2150  1200  -- 1200    Crystalloid Intake 2000 -- 2000 -- -- --    Volume (mL) (dextrose 5 % and 0.45 % NaCl with KCl 20 mEq) 150 -- 150 1200 -- 1200    IV Piggyback  100  -- 100  --  -- --    Volume (mL) (ceFAZolin in dextrose (ANCEF) IVPB premix 2 g) 100 -- 100 -- -- --    Total Intake 2750 -- 2750 1700 -- 1700       Output    Urine  --  850 850  --  -- --    Number of Times Voided 2 x 4 x 6 x 1 x -- 1 x    Urine Void (mL) -- 850 850 -- -- --    Drains  40  340 380  --  -- --    Output (mL) (Closed/Suction Drain Posterior Back Hemovac) 40 340 380 -- -- --    Blood  72  -- 72  --  -- --    Est. Blood Loss (mL) 72 -- 72 -- -- --    Total Output 112 1190 1302 -- -- --       Net I/O     2638 -1190 1448 1700 -- 1700            Intake/Output Summary (Last 24 hours) at 19 0807  Last data filed at 19 0700   Gross per 24 hour   Intake             4450 ml   Output             1302 ml   Net             3148 ml             • atenolol  25 mg DAILY   • predniSONE  5 mg DAILY   • Pharmacy Consult Request  1 Each PHARMACY TO DOSE   • MD ALERT...DO NOT ADMINISTER NSAIDS or ASPIRIN unless ORDERED By Neurosurgery  1 Each PRN   • docusate sodium  100 mg BID   • senna-docusate  1 Tab Nightly   • senna-docusate  1 Tab Q24HRS PRN   • polyethylene glycol/lytes  1 Packet BID PRN   • magnesium hydroxide  30 mL QDAY PRN   • bisacodyl  10 mg Q24HRS PRN   • fleet  1 Each Once PRN   • dextrose 5 % and 0.45 % NaCl with KCl 20 mEq   Continuous   • diphenhydrAMINE  25 mg Q6HRS PRN    Or   • diphenhydrAMINE  25 mg Q6HRS PRN   • ondansetron  4 mg Q4HRS PRN   • ondansetron  4 mg Q4HRS PRN   • cyclobenzaprine  10 mg Q8HRS PRN   • ALPRAZolam  0.25 mg BID PRN   • hydrALAZINE  10 mg Q HOUR PRN   • benzocaine-menthol  1 Lozenge Q2HRS PRN   • calcium carbonate  500 mg BID   • HYDROcodone/acetaminophen  1-2 Tab Q4HRS PRN   • oxyCODONE-acetaminophen  1-2 Tab Q4HRS PRN   • HYDROmorphone  1 mg Q3HRS PRN       Assessment and Plan:  POD #1 L2 - L5 laminectomies.  PT/OT  Drain  Likely home Friday.

## 2019-01-23 NOTE — THERAPY
"Physical Therapy Evaluation completed.   Bed Mobility:  Supine to Sit: Stand by Assist (HOB flat, no rail, log roll)  Transfers: Sit to Stand: Stand by Assist  Gait: Level Of Assist: Minimal Assist with Front-Wheel Walker       Plan of Care: Will benefit from Physical Therapy 2-3 more tx  Discharge Recommendations: Equipment: Will Continue to Assess for Equipment Needs. Post-acute therapy: see below.    See \"Rehab Therapy-Acute\" Patient Summary Report for complete documentation.       Patient is a 73 YO male s/p L2-5 laminectomy, diskectomy, and decompression with Dr. Gabriel on 1/22. Patient presented to PT with impaired insight and safety awareness and impaired balance. Patient ambulated approximately 350ft in unit with FWW and min A and ascended/descended 2 steps with handrail, step-to pattern for descent and SBA. Patient with 3x overt LOB during challenges to gait (patient attempting to pull up his sock while balancing on one leg, changing direction) that required physical assist. Provided patient education regarding spinal precautions, log roll, use of AD, and pain management techniques, patient verbalized understanding but will benefit from continued education. Patient will benefit from continued acute PT services during hospital stay to progress functional mobility and independence. Patient reports no room for use of AD in home. Recommend outpatient PT following medical clearance.  "

## 2019-01-23 NOTE — PROGRESS NOTES
Pt arrived on unit at approx 1615. Pt is aaox4.  in use. ALTAMIRANO 5/5. Denies N/T. Denies N/V. Up w/ SBA, steady gait. Ambulated from [facility] to   And then to bed on arrival. Pt denies pain +BS, good appetite. Voiding w/o difficulty. Dressing intact with min to mod drainage noted, dressing not saturated. HVAC in place, compressed. Reviewed poc with pt-verbalized understanding. Bed alarm in use. Call light in reach.

## 2019-01-23 NOTE — THERAPY
"Occupational Therapy Evaluation completed.   Functional Status: Supv supine > EOB, supv transfers with FWW, supv LB dressing without AE  Plan of Care: Patient with no further skilled OT needs in the acute care setting at this time  Discharge Recommendations:  Equipment: Will Continue to Assess for Equipment Needs (PT assessing for AD). Post-acute therapy Discharge to home with home health for additional skilled therapy services.    See \"Rehab Therapy-Acute\" Patient Summary Report for complete documentation.    74 y.o. male s/p L2-L5 lami. Seen now for OT eval. Pt educated/trained on neutral spine, safe body mechanics, lifting restriction, compensatory techniques during functional activity. Pt lives in 5th wheel, uses tub/shower at friend's property where he is parked (friends are gone for the winter). Pt agrees to have another friend supervise first few showers at home. Pt is completing basic ADL and transfers with no more than supv. Pt lives in 5th wheel and reports FWW will not fit due to space constraints. PT is following pt to progress to lower profile AD.  Reports 0/10 pain at this time. No further acute OT needs at this time.     "

## 2019-01-23 NOTE — DISCHARGE PLANNING
Care Transition Team Assessment    Information Source  Orientation : Oriented x 4  Who is responsible for making decisions for patient? : Patient         Elopement Risk  Legal Hold: No  Ambulatory or Self Mobile in Wheelchair: Yes  Disoriented: No  Psychiatric Symptoms: None  History of Wandering: No  Elopement this Admit: No  Vocalizing Wanting to Leave: No  Displays Behaviors, Body Language Wanting to Leave: No-Not at Risk for Elopement  Elopement Risk: Not at Risk for Elopement    Interdisciplinary Discharge Planning  Does Admitting Nurse Feel This Could be a Complex Discharge?: No  Primary [facility] Physician: [facility]endon  Lives with - Patient's Self Care Capacity: Alone and Able to Care For Self  Patient or legal guardian wants to designate a caregiver (see row info): No  Support Systems: Other (Comments) (friends)  Housing / Facility: Motor Home  Do You Take your Prescribed Medications Regularly: Yes ([facility])  Able to Return to Previous ADL's: Yes  Mobility Issues: No  Prior Services: None, Home-Independent  Patient Expects to be Discharged to:: outpt therapy  Assistance Needed: No  Durable Medical Equipment: Not Applicable    Discharge Preparedness  What is your plan after discharge?: Home with help  What are your discharge supports?: Other (comment) (friends)  Prior Functional Level: Ambulatory, Drives Self, Independent with Activities of Daily Living  Difficulity with ADLs: None  Difficulity with IADLs: Driving    Functional Assesment  Prior Functional Level: Ambulatory, Drives Self, Independent with Activities of Daily Living    Finances  Financial Barriers to Discharge: No  Prescription Coverage: Yes    Vision / Hearing Impairment  Right Eye Vision: Impaired, Wears Glasses  Left Eye Vision: Impaired, Wears Glasses    Values / Beliefs / Concerns  Spiritual Requests During Hospitalization: No         Domestic Abuse  Have you ever been the victim of abuse or violence?: No  Physical  Abuse or Sexual Abuse: No  Verbal Abuse or Emotional Abuse: No  Possible Abuse Reported to:: Not Applicable    Psychological Assessment  History of Substance Abuse: None         Anticipated Discharge Information  Anticipated discharge disposition: Home, Outpatient therapy (PT, OT, SLP)

## 2019-01-23 NOTE — PROGRESS NOTES
Patient A&Ox4, declines pain, educated to call RN if pain starts to increase.   Incision to back needed reinforcement due to drainage. Hvac in place with large output.   Neuro checks intact, denies numbness and tingling, PERRL, no motor drift, some dizziness when ambulating to bathroom.   Currently on 2L O2, will titrate throughout shift,  in place.   POC discussed, no further needs at this time, call light within reach, bed alarm armed, treaded socks on.

## 2019-01-24 LAB
ANION GAP SERPL CALC-SCNC: 4 MMOL/L (ref 0–11.9)
BUN SERPL-MCNC: 12 MG/DL (ref 8–22)
CALCIUM SERPL-MCNC: 8.9 MG/DL (ref 8.5–10.5)
CHLORIDE SERPL-SCNC: 103 MMOL/L (ref 96–112)
CO2 SERPL-SCNC: 32 MMOL/L (ref 20–33)
CREAT SERPL-MCNC: 0.71 MG/DL (ref 0.5–1.4)
ERYTHROCYTE [DISTWIDTH] IN BLOOD BY AUTOMATED COUNT: 45.4 FL (ref 35.9–50)
GLUCOSE SERPL-MCNC: 94 MG/DL (ref 65–99)
HCT VFR BLD AUTO: 42.3 % (ref 42–52)
HGB BLD-MCNC: 13.6 G/DL (ref 14–18)
MCH RBC QN AUTO: 32.3 PG (ref 27–33)
MCHC RBC AUTO-ENTMCNC: 32.2 G/DL (ref 33.7–35.3)
MCV RBC AUTO: 100.5 FL (ref 81.4–97.8)
PLATELET # BLD AUTO: 152 K/UL (ref 164–446)
PMV BLD AUTO: 10.7 FL (ref 9–12.9)
POTASSIUM SERPL-SCNC: 4.3 MMOL/L (ref 3.6–5.5)
RBC # BLD AUTO: 4.21 M/UL (ref 4.7–6.1)
SODIUM SERPL-SCNC: 139 MMOL/L (ref 135–145)
WBC # BLD AUTO: 10.6 K/UL (ref 4.8–10.8)

## 2019-01-24 PROCEDURE — A9270 NON-COVERED ITEM OR SERVICE: HCPCS | Performed by: PHYSICIAN ASSISTANT

## 2019-01-24 PROCEDURE — 85027 COMPLETE CBC AUTOMATED: CPT

## 2019-01-24 PROCEDURE — 80048 BASIC METABOLIC PNL TOTAL CA: CPT

## 2019-01-24 PROCEDURE — 700102 HCHG RX REV CODE 250 W/ 637 OVERRIDE(OP): Performed by: PHYSICIAN ASSISTANT

## 2019-01-24 PROCEDURE — 700112 HCHG RX REV CODE 229: Performed by: PHYSICIAN ASSISTANT

## 2019-01-24 PROCEDURE — 36415 COLL VENOUS BLD VENIPUNCTURE: CPT

## 2019-01-24 PROCEDURE — 700111 HCHG RX REV CODE 636 W/ 250 OVERRIDE (IP): Performed by: PHYSICIAN ASSISTANT

## 2019-01-24 PROCEDURE — 770001 HCHG ROOM/CARE - MED/SURG/GYN PRIV*

## 2019-01-24 RX ADMIN — ANTACID TABLETS 500 MG: 500 TABLET, CHEWABLE ORAL at 05:16

## 2019-01-24 RX ADMIN — PREDNISONE 5 MG: 5 TABLET ORAL at 05:17

## 2019-01-24 RX ADMIN — CYCLOBENZAPRINE 5 MG: 10 TABLET, FILM COATED ORAL at 11:50

## 2019-01-24 RX ADMIN — CYCLOBENZAPRINE 5 MG: 10 TABLET, FILM COATED ORAL at 21:51

## 2019-01-24 RX ADMIN — DOCUSATE SODIUM 100 MG: 100 CAPSULE, LIQUID FILLED ORAL at 05:17

## 2019-01-24 RX ADMIN — BUTALBITAL, ACETAMINOPHEN, AND CAFFEINE 1 TABLET: 50; 325; 40 TABLET ORAL at 05:16

## 2019-01-24 RX ADMIN — DOCUSATE SODIUM 100 MG: 100 CAPSULE, LIQUID FILLED ORAL at 17:27

## 2019-01-24 RX ADMIN — ATENOLOL 25 MG: 25 TABLET ORAL at 05:17

## 2019-01-24 RX ADMIN — ANTACID TABLETS 500 MG: 500 TABLET, CHEWABLE ORAL at 17:26

## 2019-01-24 RX ADMIN — BUTALBITAL, ACETAMINOPHEN, AND CAFFEINE 1 TABLET: 50; 325; 40 TABLET ORAL at 21:53

## 2019-01-24 NOTE — PROGRESS NOTES
Pt aaox4. Pt found out of bed in chair eating breakfast this AM- re-educated pt on staying in bed with HOB <30 degrees. ALTAMIRANO 5/5. Denies N/T. Denies N/V. Pt c/o minimal HA, no back pain. +BS. good appetite. Voiding w/o difficulty. Dressing intact with old drainage noted. HVAC in place, clamped per order. Reviewed poc with pt-verbalized understanding. Call light in reach.

## 2019-01-24 NOTE — PROGRESS NOTES
Neurosurgery Progress Note    Subjective:  POD#2 L2 - L5 laminectomies. Developed headache and N/V yesterday. Drain with high watery output.  Drain was clamped. Put back to bed. Headache still present, but is improved.    Exam:  Drainage to dressings  Motor 5/5  Sensory intact.    BP  Min: 115/82  Max: 158/94  Pulse  Av.8  Min: 62  Max: 72  Resp  Av.5  Min: 16  Max: 17  Temp  Av.4 °C (97.6 °F)  Min: 36.1 °C (96.9 °F)  Max: 37.2 °C (98.9 °F)  SpO2  Av %  Min: 93 %  Max: 97 %    No Data Recorded    Recent Labs      19   WBC  10.8  10.6   RBC  4.25*  4.21*   HEMOGLOBIN  13.8*  13.6*   HEMATOCRIT  41.8*  42.3   MCV  98.4*  100.5*   MCH  32.5  32.3   MCHC  33.0*  32.2*   RDW  43.5  45.4   PLATELETCT  186  152*   MPV  10.6  10.7     Recent Labs      19   SODIUM  138  139   POTASSIUM  4.8  4.3   CHLORIDE  104  103   CO2  25  32   GLUCOSE  154*  94   BUN  13  12   CREATININE  0.85  0.71   CALCIUM  8.8  8.9               Intake/Output       19 0700 - 19 0659 19 - 19 0659       Total 1900-0659 Total       Intake    P.O.  500  240 740  --  -- --    P.O. 500 240 740 -- -- --    I.V.  1200  -- 1200  --  -- --    Volume (mL) (dextrose 5 % and 0.45 % NaCl with KCl 20 mEq) 1200 -- 1200 -- -- --    Total Intake 6893 914 6097 -- -- --       Output    Urine  --  550 550  --  -- --    Number of Times Voided 4 x 2 x 6 x -- -- --    Urine Void (mL) -- 550 550 -- -- --    Emesis  --  200 200  --  -- --    Emesis -- 200 200 -- -- --    Emesis - Number of Times -- 1 x 1 x -- -- --    Drains  190  300 490  --  -- --    Output (mL) (Closed/Suction Drain Posterior Back Hemovac) 190 300 490 -- -- --    Stool  --  -- --  --  -- --    Number of Times Stooled 0 x -- 0 x -- -- --    Total Output 190 1050 1240 -- -- --       Net I/O     4470 -690 700 -- -- --            Intake/Output Summary (Last 24 hours) at  01/24/19 0806  Last data filed at 01/24/19 0600   Gross per 24 hour   Intake              240 ml   Output             1240 ml   Net            -1000 ml            • scopolamine  1 Patch Q72HRS   • metoclopramide  5 mg Q12HRS PRN   • acetaminophen  500 mg Q4HRS PRN   • acetaminophen/caffeine/butalbital 325-40-50 mg  1 Tab Q6HRS PRN   • cyclobenzaprine  5 mg Q8HRS PRN   • atenolol  25 mg DAILY   • predniSONE  5 mg DAILY   • Pharmacy Consult Request  1 Each PHARMACY TO DOSE   • MD ALERT...DO NOT ADMINISTER NSAIDS or ASPIRIN unless ORDERED By Neurosurgery  1 Each PRN   • docusate sodium  100 mg BID   • senna-docusate  1 Tab Nightly   • senna-docusate  1 Tab Q24HRS PRN   • polyethylene glycol/lytes  1 Packet BID PRN   • magnesium hydroxide  30 mL QDAY PRN   • bisacodyl  10 mg Q24HRS PRN   • fleet  1 Each Once PRN   • dextrose 5 % and 0.45 % NaCl with KCl 20 mEq   Continuous   • diphenhydrAMINE  25 mg Q6HRS PRN    Or   • diphenhydrAMINE  25 mg Q6HRS PRN   • ondansetron  4 mg Q4HRS PRN   • ondansetron  4 mg Q4HRS PRN   • ALPRAZolam  0.25 mg BID PRN   • hydrALAZINE  10 mg Q HOUR PRN   • benzocaine-menthol  1 Lozenge Q2HRS PRN   • calcium carbonate  500 mg BID   • HYDROcodone/acetaminophen  1-2 Tab Q4HRS PRN   • oxyCODONE-acetaminophen  1-2 Tab Q4HRS PRN   • HYDROmorphone  1 mg Q3HRS PRN       Assessment and Plan:  POD #2 L2 - L5 laminectomies.  Appears to be CSF leak. Leave drain clamped. Change dressing. Leave flat in bed today.

## 2019-01-24 NOTE — CARE PLAN
Problem: Safety  Goal: Will remain free from falls  Outcome: PROGRESSING AS EXPECTED  Fall risk assessed using yee ko scale.   Bed alarm armed, call light within reach, treaded socks on.     Problem: Pain Management  Goal: Pain level will decrease to patient's comfort goal  Outcome: PROGRESSING AS EXPECTED  Pain assessed using 0-10 pain scale.   Reporting new onset of headache to posterior lower head, fioricet managing pain well. Keep on bed rest HOB <30 until morning.     Problem: Mobility  Goal: Risk for activity intolerance will decrease  Outcome: PROGRESSING AS EXPECTED  Bed rest until morning, patient verbalized understanding.

## 2019-01-24 NOTE — CARE PLAN
Problem: Knowledge Deficit  Goal: Knowledge of disease process/condition, treatment plan, diagnostic tests, and medications will improve    Intervention: Explain information regarding disease process/condition, treatment plan, diagnostic tests, and medications and document in education  poc discussed. Pt on bedrest for today      Problem: Pain Management  Goal: Pain level will decrease to patient's comfort goal    Intervention: Follow pain managment plan developed in collaboration with patient and Interdisciplinary Team  Fioricet given PRN with +results. Educated pt on importance of pain control- pt verbalized understanding.

## 2019-01-24 NOTE — PROGRESS NOTES
Patient had another episode of emesis approx 200cc of brown fluid (patient just drank coke?). No other anti-emetics available and also reporting headache in posterior head. Paging for orders.    TRUNG Camarillo returned page. Medication orders placed, drain now to 1/2 compression and keep patient on bed rest with HOB <30 degrees. Updated patient on POC.

## 2019-01-24 NOTE — CARE PLAN
Problem: Infection  Goal: Will remain free from infection  Hand hygiene performed before and after pt care.  Gloves worn at all times while caring for pt.    Problem: Respiratory:  Goal: Respiratory status will improve  Encouraged and educated on use of incentive spirometer.  Pt returned demonstration.

## 2019-01-24 NOTE — PROGRESS NOTES
Paging regarding 190cc output at half compression now with ellington and bloody output. Headache still present but improved.     Page returned, orders to clamp drain until MD at bedside.

## 2019-01-25 LAB
ANION GAP SERPL CALC-SCNC: 8 MMOL/L (ref 0–11.9)
BUN SERPL-MCNC: 14 MG/DL (ref 8–22)
CALCIUM SERPL-MCNC: 8.7 MG/DL (ref 8.5–10.5)
CHLORIDE SERPL-SCNC: 103 MMOL/L (ref 96–112)
CO2 SERPL-SCNC: 29 MMOL/L (ref 20–33)
CREAT SERPL-MCNC: 0.78 MG/DL (ref 0.5–1.4)
ERYTHROCYTE [DISTWIDTH] IN BLOOD BY AUTOMATED COUNT: 45.1 FL (ref 35.9–50)
GLUCOSE SERPL-MCNC: 88 MG/DL (ref 65–99)
HCT VFR BLD AUTO: 41.4 % (ref 42–52)
HGB BLD-MCNC: 13.4 G/DL (ref 14–18)
MCH RBC QN AUTO: 32.4 PG (ref 27–33)
MCHC RBC AUTO-ENTMCNC: 32.4 G/DL (ref 33.7–35.3)
MCV RBC AUTO: 100 FL (ref 81.4–97.8)
PLATELET # BLD AUTO: 167 K/UL (ref 164–446)
PMV BLD AUTO: 10.6 FL (ref 9–12.9)
POTASSIUM SERPL-SCNC: 3.9 MMOL/L (ref 3.6–5.5)
RBC # BLD AUTO: 4.14 M/UL (ref 4.7–6.1)
SODIUM SERPL-SCNC: 140 MMOL/L (ref 135–145)
WBC # BLD AUTO: 8.3 K/UL (ref 4.8–10.8)

## 2019-01-25 PROCEDURE — A9270 NON-COVERED ITEM OR SERVICE: HCPCS | Performed by: PHYSICIAN ASSISTANT

## 2019-01-25 PROCEDURE — 85027 COMPLETE CBC AUTOMATED: CPT

## 2019-01-25 PROCEDURE — 80048 BASIC METABOLIC PNL TOTAL CA: CPT

## 2019-01-25 PROCEDURE — 700102 HCHG RX REV CODE 250 W/ 637 OVERRIDE(OP): Performed by: PHYSICIAN ASSISTANT

## 2019-01-25 PROCEDURE — 36415 COLL VENOUS BLD VENIPUNCTURE: CPT

## 2019-01-25 PROCEDURE — 700112 HCHG RX REV CODE 229: Performed by: PHYSICIAN ASSISTANT

## 2019-01-25 PROCEDURE — 700111 HCHG RX REV CODE 636 W/ 250 OVERRIDE (IP): Performed by: PHYSICIAN ASSISTANT

## 2019-01-25 PROCEDURE — 770001 HCHG ROOM/CARE - MED/SURG/GYN PRIV*

## 2019-01-25 RX ADMIN — Medication 1 TABLET: at 21:09

## 2019-01-25 RX ADMIN — ANTACID TABLETS 500 MG: 500 TABLET, CHEWABLE ORAL at 05:13

## 2019-01-25 RX ADMIN — PREDNISONE 5 MG: 5 TABLET ORAL at 05:13

## 2019-01-25 RX ADMIN — BUTALBITAL, ACETAMINOPHEN, AND CAFFEINE 1 TABLET: 50; 325; 40 TABLET ORAL at 10:10

## 2019-01-25 RX ADMIN — MAGNESIUM HYDROXIDE 30 ML: 400 SUSPENSION ORAL at 05:13

## 2019-01-25 RX ADMIN — DOCUSATE SODIUM 100 MG: 100 CAPSULE, LIQUID FILLED ORAL at 05:13

## 2019-01-25 RX ADMIN — POLYETHYLENE GLYCOL 3350 1 PACKET: 17 POWDER, FOR SOLUTION ORAL at 21:09

## 2019-01-25 RX ADMIN — CYCLOBENZAPRINE 5 MG: 10 TABLET, FILM COATED ORAL at 21:09

## 2019-01-25 RX ADMIN — OXYCODONE AND ACETAMINOPHEN 1 TABLET: 5; 325 TABLET ORAL at 18:30

## 2019-01-25 NOTE — PROGRESS NOTES
Neurosurgery Progress Note    Subjective:  POD#3 L2 - L5 laminectomies. Developed headache. Drain with high watery output.  Drain was clamped. Put back to bed. Headache still present, but is improved.    Exam:  Drainage to dressings  Motor 5/5  Sensory intact.    BP  Min: 102/56  Max: 107/52  Pulse  Av.7  Min: 61  Max: 67  Resp  Av.3  Min: 18  Max: 19  Temp  Av.4 °C (97.5 °F)  Min: 36.3 °C (97.3 °F)  Max: 36.6 °C (97.9 °F)  SpO2  Av %  Min: 93 %  Max: 98 %    No Data Recorded    Recent Labs      19   0240   WBC  10.8  10.6  8.3   RBC  4.25*  4.21*  4.14*   HEMOGLOBIN  13.8*  13.6*  13.4*   HEMATOCRIT  41.8*  42.3  41.4*   MCV  98.4*  100.5*  100.0*   MCH  32.5  32.3  32.4   MCHC  33.0*  32.2*  32.4*   RDW  43.5  45.4  45.1   PLATELETCT  186  152*  167   MPV  10.6  10.7  10.6     Recent Labs      19   0240   SODIUM  138  139  140   POTASSIUM  4.8  4.3  3.9   CHLORIDE  104  103  103   CO2  25  32  29   GLUCOSE  154*  94  88   BUN  13  12  14   CREATININE  0.85  0.71  0.78   CALCIUM  8.8  8.9  8.7               Intake/Output       19 0700 - 19 0659 19 07 - 19 0659       8670-3631 Total  7683-5624 Total       Intake    P.O.  1500  -- 1500  --  -- --    P.O. 1500 -- 1500 -- -- --    Total Intake 1500 -- 1500 -- -- --       Output    Urine  600  775 1375  --  -- --    Number of Times Voided -- 1 x 1 x -- -- --    Urine Void (mL)  -- -- --    Drains  45  0 45  --  -- --    Output (mL) (Closed/Suction Drain Posterior Back Hemovac) 45 0 45 -- -- --    Total Output  -- -- --       Net I/O     855 -775 80 -- -- --            Intake/Output Summary (Last 24 hours) at 19 0805  Last data filed at 19 0400   Gross per 24 hour   Intake             1500 ml   Output             1420 ml   Net               80 ml            • scopolamine  1 Patch Q72HRS   •  metoclopramide  5 mg Q12HRS PRN   • acetaminophen  500 mg Q4HRS PRN   • acetaminophen/caffeine/butalbital 325-40-50 mg  1 Tab Q6HRS PRN   • cyclobenzaprine  5 mg Q8HRS PRN   • atenolol  25 mg DAILY   • predniSONE  5 mg DAILY   • Pharmacy Consult Request  1 Each PHARMACY TO DOSE   • MD ALERT...DO NOT ADMINISTER NSAIDS or ASPIRIN unless ORDERED By Neurosurgery  1 Each PRN   • docusate sodium  100 mg BID   • senna-docusate  1 Tab Nightly   • senna-docusate  1 Tab Q24HRS PRN   • polyethylene glycol/lytes  1 Packet BID PRN   • magnesium hydroxide  30 mL QDAY PRN   • bisacodyl  10 mg Q24HRS PRN   • fleet  1 Each Once PRN   • dextrose 5 % and 0.45 % NaCl with KCl 20 mEq   Continuous   • diphenhydrAMINE  25 mg Q6HRS PRN    Or   • diphenhydrAMINE  25 mg Q6HRS PRN   • ondansetron  4 mg Q4HRS PRN   • ondansetron  4 mg Q4HRS PRN   • ALPRAZolam  0.25 mg BID PRN   • hydrALAZINE  10 mg Q HOUR PRN   • benzocaine-menthol  1 Lozenge Q2HRS PRN   • calcium carbonate  500 mg BID   • HYDROcodone/acetaminophen  1-2 Tab Q4HRS PRN   • oxyCODONE-acetaminophen  1-2 Tab Q4HRS PRN   • HYDROmorphone  1 mg Q3HRS PRN       Assessment and Plan:  POD #3 L2 - L5 laminectomies.  D/C drain  Redress incision with ABD and cover with ioban.  Leave flat today

## 2019-01-25 NOTE — CARE PLAN
Problem: Safety  Goal: Will remain free from falls  Bed alarm in use.  Steady gait.  Pt not calling appropriately for assistance.  Has gotten up a couple of times without calling.  Safety education provided.    Problem: Mobility  Goal: Risk for activity intolerance will decrease  Pt on bedrest today for dural tear.  Drain remove and incision redressed with ABD and Ioban.  Can have HOB up to 30 degrees for meals.  Pt hasn't been compliant and has sat up and gotten OOB without calling for assistance.

## 2019-01-25 NOTE — DISCHARGE PLANNING
Anticipated Discharge Disposition:   home    Action:   Updated Wendy at [facility].   Pt is on bedrest today.     Barriers to Discharge:   Surgical clearance      Plan:   RN kindly notify CM if there are any discharge concerns.

## 2019-01-25 NOTE — PROGRESS NOTES
Pt refused to lay flat to eat dinner. Re-educated pt on laying flat for dural tear. Pt still sat on EOB.

## 2019-01-25 NOTE — PROGRESS NOTES
RN MOBILITY NOTE     Surgery patient?: Yes  Date of surgery: 1/22/19  Ambulated 50 ft on day of surgery? (N/A if today is not date of surgery):  N/A  Number of times ambulated 50 feet or greater today: 0  Patient has been up to chair, edge of bed or HOB 90 degrees for all meals?: No  Goal met? (goal is ambulating at least 50 feet 2 times on day shift, one time on night shift): No  If patient did not meet mobility goal, why?: Bed rest for dural tear

## 2019-01-25 NOTE — CARE PLAN
Problem: Safety  Goal: Will remain free from injury  Outcome: PROGRESSING AS EXPECTED  Precautions in place, no injury noted, pt calls appropriately, call light in reach, bed alarmed, hourly rounding    Problem: Infection  Goal: Will remain free from infection  Outcome: PROGRESSING AS EXPECTED  VSS, Afebrile, AO4, breaths normal, UO WNL, incision CDI, drain output WNL.

## 2019-01-25 NOTE — PROGRESS NOTES
Pt reports numbness to R ball of foot. R leg strength and DPF strong. Report given to JOSHUA Singh.

## 2019-01-25 NOTE — PROGRESS NOTES
"Pt on bedrest.  Per Peterson GAMBLE, pt to remain flat, but HOB can be up to 30 degrees for meals.  Pt non-compliant and insisted on sitting at side of bed for breakfast despite education.  Stated \"How am I supposed to eat when my head is low?  They can come talk to me if they have a problem with me sitting up.\"  Educated about rolling to side to eat.  Pt refused.  Headache continued and Fioracet given.  Pt didn't understand about a possible dural tear and implications, so this RN educated him on importance of lying flat.  "

## 2019-01-25 NOTE — PROGRESS NOTES
Received report and assumed pt care.  A&O x4.  Bed alarm and treaded socks on.  Call light and personal belongings within reach.  Bed locked and at lowest position.  ALTAMIRANO.  VSS.  Bedrest at this time with HOB < 30 degrees due to headaches.  Hemovac in use.

## 2019-01-26 PROCEDURE — 700102 HCHG RX REV CODE 250 W/ 637 OVERRIDE(OP): Performed by: PHYSICIAN ASSISTANT

## 2019-01-26 PROCEDURE — 770001 HCHG ROOM/CARE - MED/SURG/GYN PRIV*

## 2019-01-26 PROCEDURE — A9270 NON-COVERED ITEM OR SERVICE: HCPCS | Performed by: PHYSICIAN ASSISTANT

## 2019-01-26 PROCEDURE — 700111 HCHG RX REV CODE 636 W/ 250 OVERRIDE (IP): Performed by: PHYSICIAN ASSISTANT

## 2019-01-26 PROCEDURE — 700112 HCHG RX REV CODE 229: Performed by: PHYSICIAN ASSISTANT

## 2019-01-26 RX ADMIN — ANTACID TABLETS 500 MG: 500 TABLET, CHEWABLE ORAL at 05:56

## 2019-01-26 RX ADMIN — DOCUSATE SODIUM 100 MG: 100 CAPSULE, LIQUID FILLED ORAL at 17:04

## 2019-01-26 RX ADMIN — CYCLOBENZAPRINE 5 MG: 10 TABLET, FILM COATED ORAL at 17:04

## 2019-01-26 RX ADMIN — CYCLOBENZAPRINE 5 MG: 10 TABLET, FILM COATED ORAL at 05:55

## 2019-01-26 RX ADMIN — DOCUSATE SODIUM 100 MG: 100 CAPSULE, LIQUID FILLED ORAL at 05:56

## 2019-01-26 RX ADMIN — OXYCODONE AND ACETAMINOPHEN 2 TABLET: 5; 325 TABLET ORAL at 08:26

## 2019-01-26 RX ADMIN — ANTACID TABLETS 500 MG: 500 TABLET, CHEWABLE ORAL at 17:04

## 2019-01-26 RX ADMIN — ATENOLOL 25 MG: 25 TABLET ORAL at 05:56

## 2019-01-26 RX ADMIN — OXYCODONE AND ACETAMINOPHEN 2 TABLET: 5; 325 TABLET ORAL at 00:54

## 2019-01-26 RX ADMIN — MAGNESIUM CITRATE 296 ML: 1.75 LIQUID ORAL at 13:06

## 2019-01-26 RX ADMIN — PREDNISONE 5 MG: 5 TABLET ORAL at 05:56

## 2019-01-26 NOTE — PROGRESS NOTES
Surgery patient?: yes  Date of surgery: 1/22  Ambulated 50 ft on day of surgery? (N/A if today is not date of surgery): yes  Number of times ambulated 50 feet or greater today: 0 (bed rest)  Patient has been up to chair, edge of bed or HOB 90 degrees for all meals?: 3/3  Goal met? (goal is ambulating at least 50 feet 2 times on day shift, one time on night shift): no   If patient did not meet mobility goal, why?: bed rest from 1/24 to 1/26

## 2019-01-26 NOTE — PROGRESS NOTES
Patient A&Ox4, reporting moderate back pain, medicated per MAR and educated on PRN frequencies, pain management plan established.  Bed rest until tomorrow morning, MD to re-assess.   Drain removed by day RN, no headache since removal. Educated to alert RN if headache returns.  5/5 strength to all extremities, denies numbness and tingling, PERRL.  Incision to back covered with dressing, CDI, Ioban dressing over drain site.   Still requiring 1L O2, attempting to wean, pulling [phone] with IS,  in place.   POC discussed, no further needs at this time, call light within reach, bed alarm armed, SCDs + treaded socks on.

## 2019-01-26 NOTE — PROGRESS NOTES
Report received. Assumed care. Pt in bed awake. A/O x4. VSS. Responds appropriately. C/O pain, medicated per MAR, no  SOB. Assessment complete. Surgical dressing to the back in place, cdi. Discussed POC, pain control, mobility, PT/OT, monitor headaches, safety, DC planning, pt verbalizes understanding. Explained importance of calling before getting OOB. Call light and belongings within reach. Bed alarm on. Bed in the lowest position. Treaded socks in place. Hourly rounding in progress. Will continue to monitor .

## 2019-01-26 NOTE — CARE PLAN
Problem: Safety  Goal: Will remain free from falls  Outcome: PROGRESSING AS EXPECTED  Fall risk assessed using yee ko scale.   Bed alarm armed, call light within reach, treaded socks on.  Bed rest orders until tomorrow 1/26.     Problem: Bowel/Gastric:  Goal: Normal bowel function is maintained or improved  Outcome: PROGRESSING SLOWER THAN EXPECTED  No BM since 1/22. Provided stool softeners and miralax. Patient has been bed rest for two days.  + bowel sounds, + flatus, abdomen semi-firm.     Problem: Pain Management  Goal: Pain level will decrease to patient's comfort goal  Outcome: PROGRESSING AS EXPECTED  Pain assessed using 0-10 pain scale.   Headache pain has improved since Hvac removed.

## 2019-01-26 NOTE — PROGRESS NOTES
Neurosurgery Progress Note    Subjective:  POD#4 L2 - L5 laminectomies. Developed headache. Drain with high watery output yesterday.  Patient has been flat in bed since yesterday and HA has resolved.  Denies bilateral lower extremity radicular pain.  Voiding, but no BM for several days.      Exam:  Drainage to dressings.  Motor 4/5 to left EHL, remainder 5/5  Sensory intact.    BP  Min: 122/72  Max: 137/77  Pulse  Av.3  Min: 62  Max: 89  Resp  Av.7  Min: 17  Max: 18  Temp  Av.6 °C (97.8 °F)  Min: 36.4 °C (97.5 °F)  Max: 36.8 °C (98.2 °F)  SpO2  Av.7 %  Min: 92 %  Max: 93 %    No Data Recorded    Recent Labs      19   02319   0240   WBC  10.6  8.3   RBC  4.21*  4.14*   HEMOGLOBIN  13.6*  13.4*   HEMATOCRIT  42.3  41.4*   MCV  100.5*  100.0*   MCH  32.3  32.4   MCHC  32.2*  32.4*   RDW  45.4  45.1   PLATELETCT  152*  167   MPV  10.7  10.6     Recent Labs      19   02319   0240   SODIUM  139  140   POTASSIUM  4.3  3.9   CHLORIDE  103  103   CO2  32  29   GLUCOSE  94  88   BUN  12  14   CREATININE  0.71  0.78   CALCIUM  8.9  8.7               Intake/Output       19 - 1959 19 - 19 0659       Total  Total       Intake    P.O.  --  -- --  500  -- 500    P.O. -- -- -- 500 -- 500    Total Intake -- -- -- 500 -- 500       Output    Urine  --  450 450  --  -- --    Number of Times Voided -- 2 x 2 x 1 x -- 1 x    Urine Void (mL) -- 450 450 -- -- --    Drains  0  -- 0  --  -- --    Output (mL) ([REMOVED] Closed/Suction Drain Posterior Back Hemovac) 0 -- 0 -- -- --    Stool  --  -- --  --  -- --    Number of Times Stooled 0 x -- 0 x -- -- --    Total Output 0 450 450 -- -- --       Net I/O     0 -450 -450 500 -- 500            Intake/Output Summary (Last 24 hours) at 19 0918  Last data filed at 19 0700   Gross per 24 hour   Intake              500 ml   Output              450 ml   Net                50 ml            • magnesium citrate  296 mL Once   • scopolamine  1 Patch Q72HRS   • metoclopramide  5 mg Q12HRS PRN   • acetaminophen  500 mg Q4HRS PRN   • acetaminophen/caffeine/butalbital 325-40-50 mg  1 Tab Q6HRS PRN   • cyclobenzaprine  5 mg Q8HRS PRN   • atenolol  25 mg DAILY   • predniSONE  5 mg DAILY   • Pharmacy Consult Request  1 Each PHARMACY TO DOSE   • MD ALERT...DO NOT ADMINISTER NSAIDS or ASPIRIN unless ORDERED By Neurosurgery  1 Each PRN   • docusate sodium  100 mg BID   • senna-docusate  1 Tab Nightly   • senna-docusate  1 Tab Q24HRS PRN   • polyethylene glycol/lytes  1 Packet BID PRN   • magnesium hydroxide  30 mL QDAY PRN   • bisacodyl  10 mg Q24HRS PRN   • fleet  1 Each Once PRN   • dextrose 5 % and 0.45 % NaCl with KCl 20 mEq   Continuous   • diphenhydrAMINE  25 mg Q6HRS PRN    Or   • diphenhydrAMINE  25 mg Q6HRS PRN   • ondansetron  4 mg Q4HRS PRN   • ondansetron  4 mg Q4HRS PRN   • ALPRAZolam  0.25 mg BID PRN   • hydrALAZINE  10 mg Q HOUR PRN   • benzocaine-menthol  1 Lozenge Q2HRS PRN   • calcium carbonate  500 mg BID   • HYDROcodone/acetaminophen  1-2 Tab Q4HRS PRN   • oxyCODONE-acetaminophen  1-2 Tab Q4HRS PRN   • HYDROmorphone  1 mg Q3HRS PRN       Assessment and Plan:  POD #4 L2 - L5 laminectomies.  Ok for OOB as tolerated.  PT/OT/ambulate as tolerated today.  If develops HA while upright, then have patient remain flat again and call [phone] to inform.  Bowel regimen today.  Change dressing this evening.  Hopefully home tomorrow if patient ambulating without HA, incision dry, and has BM.

## 2019-01-26 NOTE — CARE PLAN
Problem: Safety  Goal: Will remain free from injury  Outcome: PROGRESSING AS EXPECTED  Treaded socks in place, bed in the lowest position, bed alarm on, call light and belongings within reach, pt call for assistance appropriately    Problem: Venous Thromboembolism (VTW)/Deep Vein Thrombosis (DVT) Prevention:  Goal: Patient will participate in Venous Thrombosis (VTE)/Deep Vein Thrombosis (DVT)Prevention Measures  Outcome: PROGRESSING AS EXPECTED  scds on    Problem: Pain Management  Goal: Pain level will decrease to patient's comfort goal  Outcome: PROGRESSING AS EXPECTED  Medicated with percocet for pain per MAR with adequate pain control, hourly rounding in progress

## 2019-01-27 VITALS
TEMPERATURE: 97.8 F | DIASTOLIC BLOOD PRESSURE: 77 MMHG | RESPIRATION RATE: 18 BRPM | WEIGHT: 215.61 LBS | HEART RATE: 68 BPM | BODY MASS INDEX: 30.87 KG/M2 | OXYGEN SATURATION: 97 % | HEIGHT: 70 IN | SYSTOLIC BLOOD PRESSURE: 136 MMHG

## 2019-01-27 PROCEDURE — 97530 THERAPEUTIC ACTIVITIES: CPT

## 2019-01-27 PROCEDURE — 700111 HCHG RX REV CODE 636 W/ 250 OVERRIDE (IP): Performed by: PHYSICIAN ASSISTANT

## 2019-01-27 PROCEDURE — A9270 NON-COVERED ITEM OR SERVICE: HCPCS | Performed by: PHYSICIAN ASSISTANT

## 2019-01-27 PROCEDURE — 700102 HCHG RX REV CODE 250 W/ 637 OVERRIDE(OP): Performed by: PHYSICIAN ASSISTANT

## 2019-01-27 PROCEDURE — 97116 GAIT TRAINING THERAPY: CPT

## 2019-01-27 RX ORDER — CEPHALEXIN 500 MG/1
500 CAPSULE ORAL 4 TIMES DAILY
Qty: 20 CAP | Refills: 0 | Status: SHIPPED | OUTPATIENT
Start: 2019-01-27 | End: 2019-01-29

## 2019-01-27 RX ORDER — CYCLOBENZAPRINE HCL 5 MG
10 TABLET ORAL 3 TIMES DAILY PRN
Qty: 90 TAB | Refills: 0 | Status: SHIPPED | OUTPATIENT
Start: 2019-01-27 | End: 2019-01-29

## 2019-01-27 RX ORDER — HYDROCODONE BITARTRATE AND ACETAMINOPHEN 10; 325 MG/1; MG/1
1-2 TABLET ORAL EVERY 4 HOURS PRN
Qty: 84 TAB | Refills: 0 | Status: SHIPPED | OUTPATIENT
Start: 2019-01-27 | End: 2019-01-29

## 2019-01-27 RX ADMIN — ANTACID TABLETS 500 MG: 500 TABLET, CHEWABLE ORAL at 05:37

## 2019-01-27 RX ADMIN — PREDNISONE 5 MG: 5 TABLET ORAL at 05:37

## 2019-01-27 ASSESSMENT — COGNITIVE AND FUNCTIONAL STATUS - GENERAL
TURNING FROM BACK TO SIDE WHILE IN FLAT BAD: A LITTLE
MOVING TO AND FROM BED TO CHAIR: A LITTLE
WALKING IN HOSPITAL ROOM: A LITTLE
MOBILITY SCORE: 19
STANDING UP FROM CHAIR USING ARMS: A LITTLE
CLIMB 3 TO 5 STEPS WITH RAILING: A LITTLE
SUGGESTED CMS G CODE MODIFIER MOBILITY: CK

## 2019-01-27 ASSESSMENT — GAIT ASSESSMENTS
DISTANCE (FEET): 50
GAIT LEVEL OF ASSIST: STAND BY ASSIST
ASSISTIVE DEVICE: FRONT WHEEL WALKER
DEVIATION: BRADYKINETIC

## 2019-01-27 NOTE — DISCHARGE INSTRUCTIONS
Discharge Instructions    Discharged to home by car with relative. Discharged via wheelchair, hospital escort: Yes.  Special equipment needed: Walker    Be sure to schedule a follow-up appointment with your primary care doctor or any specialists as instructed.     Discharge Plan:     Ok to shower.    No NSAID's for 1 week.    Avoid repetitive lifting, pushing, pulling greater than 15 pounds.   Follow up with [facility] in 2/6 weeks.    Call with questions.    Pneumococcal Vaccine Administered/Refused: Not given - Patient refused pneumococcal vaccine  Influenza Vaccine Indication: Patient Refuses    I understand that a diet low in cholesterol, fat, and sodium is recommended for good health. Unless I have been given specific instructions below for another diet, I accept this instruction as my diet prescription.   Other diet: Regular    Special Instructions:   Ok to shower. Keep incision open to air. No dressing needed. Keep incision clean and dry.  No NSAID's for 1 week.    Avoid repetitive lifting, pushing, pulling greater than 15 pounds. Follow up with [facility] in 2/6 weeks.  Call with questions.    · Is patient discharged on Warfarin / Coumadin?   No     Laminectomy, Care After  Refer to this sheet in the next few weeks. These instructions provide you with information about caring for yourself after your procedure. Your health care provider may also give you more specific instructions. Your treatment has been planned according to current medical practices, but problems sometimes occur. Call your health care provider if you have any problems or questions after your procedure.  WHAT TO EXPECT AFTER THE PROCEDURE  After your procedure, it is common to have some pain around the incision.  HOME CARE INSTRUCTIONS  Bathing  · You may shower after the bandage (dressing) has been removed or as directed by your health care provider.  · Do not take baths, swim, or use a hot tub for 2 weeks or until your incision has  healed completely. Check with your health care provider before you start any of these activities.  Incision Care  · There are many different ways to close and cover an incision, including stitches, skin glue, and adhesive strips. Follow instructions from your health care provider about:  ¨ Incision care.  ¨ Dressing changes and removal.  ¨ Incision closure removal.  · Check your incision area every day for signs of infection. Watch for:  ¨ Redness, swelling, or pain.  ¨ Fluid, blood, or pus.  Activity  · Return to your normal activities as directed by your health care provider. Ask your health care provider what activities are safe for you.  · Perform breathing exercises if directed by your health care provider.  · Avoid bending or twisting at your waist. Always bend at your knees.  · Do not sit for more than 20-30 minutes at a time. Lie down or walk between periods of sitting.  · Do not lift anything that is heavier than 10 lb (4.5 kg).  · Do not drive for 2 weeks after your procedure or as directed by your health care provider. You may be a passenger for 20-30 minute trips.  · Do not drive or operate heavy machinery while taking pain medicine.  General Instructions  · Take medicines only as directed by your health care provider.  · Keep all follow-up visits as directed by your health care provider. This is important.  SEEK MEDICAL CARE IF:  · You have redness, swelling, or increasing pain in the area of your incision.  · You notice a bad smell coming from the incision or dressing.  · You are not able to return to activities or perform exercises as instructed by your health care provider.  SEEK IMMEDIATE MEDICAL CARE IF:  · You develop a rash.  · You have fluid, blood, or pus coming from your incision.  · You have chills or a fever.  · You have episodes of dizziness or fainting while standing.  · You develop shortness of breath or you have difficulty breathing.  · You lose the ability to control your bladder or  bowel.  · You become weak.  · You cannot use your legs.     This information is not intended to replace advice given to you by your health care provider. Make sure you discuss any questions you have with your health care provider.     Document Released: 07/07/2006 Document Revised: 05/03/2016 Document Reviewed: 12/14/2015  [employer] Patient Education ©2016 [employer]      Depression / Suicide Risk    As you are discharged from this [facility] facility, it is important to learn how to keep safe from harming yourself.    Recognize the warning signs:  · Abrupt changes in personality, positive or negative- including increase in energy   · Giving away possessions  · Change in eating patterns- significant weight changes-  positive or negative  · Change in sleeping patterns- unable to sleep or sleeping all the time   · Unwillingness or inability to communicate  · Depression  · Unusual sadness, discouragement and loneliness  · Talk of wanting to die  · Neglect of personal appearance   · Rebelliousness- reckless behavior  · Withdrawal from people/activities they love  · Confusion- inability to concentrate     If you or a loved one observes any of these behaviors or has concerns about self-harm, here's what you can do:  · Talk about it- your feelings and reasons for harming yourself  · Remove any means that you might use to hurt yourself (examples: pills, rope, extension cords, firearm)  · Get professional help from the community (Mental Health, Substance Abuse, psychological counseling)  · Do not be alone:Call your Safe Contact- someone whom you trust who will be there for you.  · Call your local CRISIS HOTLINE 907-4891 or 042-396-1252  · Call your local Children's Mobile Crisis Response Team [place] (611) 999-5370 or www.Skribit  · Call the toll free National Suicide Prevention Hotlines   · National Suicide Prevention Lifeline 181-707-NDWY (9115)  · National Hope Line Network 800-SUICIDE  (791-0401)

## 2019-01-27 NOTE — PROGRESS NOTES
RN MOBILITY NOTE     Surgery patient?: yes  Date of surgery: 1/22/19  Ambulated 50 ft on day of surgery? (N/A if today is not date of surgery): n/a  Number of times ambulated 50 feet or greater today: 1  Patient has been up to chair, edge of bed or HOB 90 degrees for all meals?: for dinner only  Goal met? (goal is ambulating at least 50 feet 2 times on day shift, one time on night shift): no  If patient did not meet mobility goal, why?: Pt off of bed rest 1/26

## 2019-01-27 NOTE — PROGRESS NOTES
Neurosurgery Progress Note    Subjective:  POD#5 L2 - L5 laminectomies.  Doing fine, pain controlled.  Ambulating without HA.  Dressing c/d/i.  Voiding, had BM.  Drain out.     Exam:  Drainage to dressings.  Motor 4/5 to left EHL, remainder 5/5  Sensory intact.    BP  Min: 106/64  Max: 145/79  Pulse  Av  Min: 62  Max: 72  Resp  Av.7  Min: 17  Max: 18  Temp  Av.4 °C (97.6 °F)  Min: 36.1 °C (97 °F)  Max: 36.6 °C (97.9 °F)  SpO2  Av.3 %  Min: 94 %  Max: 95 %    No Data Recorded    Recent Labs      19   0240   WBC  8.3   RBC  4.14*   HEMOGLOBIN  13.4*   HEMATOCRIT  41.4*   MCV  100.0*   MCH  32.4   MCHC  32.4*   RDW  45.1   PLATELETCT  167   MPV  10.6     Recent Labs      19   0240   SODIUM  140   POTASSIUM  3.9   CHLORIDE  103   CO2  29   GLUCOSE  88   BUN  14   CREATININE  0.78   CALCIUM  8.7               Intake/Output       19 0700 - 19 0659 19 0700 - 19 0659      [id number] 6726-6896 Total 2140-0831 8791-8490 Total       Intake    P.O.  1020  -- 1020  --  -- --    P.O. 1020 -- 1020 -- -- --    Total Intake 1020 -- 1020 -- -- --       Output    Urine  --  -- --  --  -- --    Number of Times Voided 1 x 2 x 3 x -- -- --    Stool  --  -- --  --  -- --    Number of Times Stooled -- 1 x 1 x -- -- --    Total Output -- -- -- -- -- --       Net I/O     1020 -- 1020 -- -- --            Intake/Output Summary (Last 24 hours) at 19 0905  Last data filed at 19 1200   Gross per 24 hour   Intake              280 ml   Output                0 ml   Net              280 ml            • scopolamine  1 Patch Q72HRS   • metoclopramide  5 mg Q12HRS PRN   • acetaminophen  500 mg Q4HRS PRN   • acetaminophen/caffeine/butalbital 325-40-50 mg  1 Tab Q6HRS PRN   • cyclobenzaprine  5 mg Q8HRS PRN   • atenolol  25 mg DAILY   • predniSONE  5 mg DAILY   • Pharmacy Consult Request  1 Each PHARMACY TO DOSE   • MD ALERT...DO NOT ADMINISTER NSAIDS or ASPIRIN unless ORDERED By  Neurosurgery  1 Each PRN   • docusate sodium  100 mg BID   • senna-docusate  1 Tab Nightly   • senna-docusate  1 Tab Q24HRS PRN   • polyethylene glycol/lytes  1 Packet BID PRN   • magnesium hydroxide  30 mL QDAY PRN   • bisacodyl  10 mg Q24HRS PRN   • fleet  1 Each Once PRN   • dextrose 5 % and 0.45 % NaCl with KCl 20 mEq   Continuous   • diphenhydrAMINE  25 mg Q6HRS PRN    Or   • diphenhydrAMINE  25 mg Q6HRS PRN   • ondansetron  4 mg Q4HRS PRN   • ondansetron  4 mg Q4HRS PRN   • ALPRAZolam  0.25 mg BID PRN   • hydrALAZINE  10 mg Q HOUR PRN   • benzocaine-menthol  1 Lozenge Q2HRS PRN   • calcium carbonate  500 mg BID   • HYDROcodone/acetaminophen  1-2 Tab Q4HRS PRN   • oxyCODONE-acetaminophen  1-2 Tab Q4HRS PRN   • HYDROmorphone  1 mg Q3HRS PRN       Assessment and Plan:  POD #5 L2 - L5 laminectomies.  D/c home today.

## 2019-01-27 NOTE — THERAPY
"Physical Therapy Treatment completed.   Bed Mobility:  Supine to Sit: Supervised  Transfers: Sit to Stand: Supervised  Gait: Level Of Assist: Stand by Assist with Front-Wheel Walker       Plan of Care: Will benefit from Physical Therapy 3 times per week  Discharge Recommendations: Equipment: Front-Wheel Walker.     See \"Rehab Therapy-Acute\" Patient Summary Report for complete documentation.     Pt progressing well w/ therapy. Pt is able to perform most mobility at a SBA level. Pt stating that he won't be able to use a FWW in his house however he furniture crawls to get around. Pt educated on need for FWW while out in the community w/ pt understanding. Pt stating that \"he will have no problem w/ the stairs as he can pull himself up\". Pt currently is at a level in which he can DC home. Pt will benefit from outpatient therapy once cleared by MD  "

## 2019-01-27 NOTE — CARE PLAN
Problem: Safety  Goal: Will remain free from injury  Outcome: PROGRESSING AS EXPECTED  Pt has remained free from injury this visit. Pt calls for help appropriately. Fall precautions in place, pt educated on fall risk.    Problem: Infection  Goal: Will remain free from infection  Outcome: PROGRESSING AS EXPECTED  No s/s of infection. Pt educated on hand hygiene and IS use.

## 2019-01-27 NOTE — PROGRESS NOTES
Patient discharged to home with wheeled walker. IV removed. The patient is declining flu and pneumonia vaccination. DC paperwork discussed and all questions answered. Patient has belongings and scripts for Keflex, norco and flexeril.

## 2019-01-27 NOTE — DISCHARGE PLANNING
Anticipated Discharge Disposition:   home    Action:  Friends will  today.   Choice for FWW made and faxed to [facility]  Ordered through [employer].     Barriers to Discharge:   none    Plan:   Dc home.

## 2019-01-27 NOTE — PROGRESS NOTES
Pt A/Ox4. Denies numbness/tingling. Reports minimal pain and declines intervention. Pt has an IV present and patent. Pt has call light within reach, bed locked and in lowest position. Pt has not had a BM yet.

## 2019-01-27 NOTE — DISCHARGE SUMMARY
DATE OF ADMISSION:  01/22/2019    DATE OF DISCHARGE:  01/27/2019    DISCHARGE DIAGNOSES:  1.  Low back pain.  2.  Bilateral lower extremity pain.    SURGERY PERFORMED:  Open L2-L5 laminectomies performed by Dr. Terrence Gabriel.    COMPLICATIONS:  None.    REASON FOR ADMISSION:  This is a 74-year-old male [facility] for [facility] complaints.  He does give over 1-year history of progressive   low back pain and lower extremity pain.  These symptoms have been refractory   to conservative treatments including chiropractic care and physical therapy,   as well as antiinflammatories and neuroleptic medications such as gabapentin.    His preoperative workup showed L2-L3, L3-L4 and L4-L5 degenerative disk   disease with stenosis from bilateral facet ligamentous hypertrophy at these   levels and the patient was hereby indicated for the above surgery.    HOSPITAL COURSE:  The patient was admitted on the date of surgery.  He   underwent the above surgery without complication and was moved to the   neurosciences floor.  On the neurosciences floor, he made a slow, but steady   recovery.  Postoperatively, he did unfortunately develop significant headaches   while being upright as well as evidence of possible delayed CSF leak as he   did have increased watery output into his Hemovac.  His Hemovac was clamped   and subsequently removed.  The patient was laid flat for 24 hours and then   slowly mobilized after that.  Here now on postoperative day #5, the patient's   pain is controlled with oral medications.  He is ambulating.  He is voiding.    He is tolerating oral food and medications well.  His motor exam is 5/5.  He   has been upright without headaches and his dressing remains clean, dry, and   intact and he is hereby cleared for discharge home under stable condition   after a relatively uneventful hospital stay.    DISCHARGE INSTRUCTIONS:  The patient is to eat a normal diet as tolerated.  He   is to walk as much  as tolerated.  He is to avoid repetitive bending at the   waist.  He is to avoid lifting, pushing, pulling greater than 15 pounds.  It   will be okay for the patient to drive in 2 weeks' time or when he is   comfortable not taking narcotic pain medications.  He should take an   over-the-counter stool softener while taking narcotic pain medications.  He   should avoid NSAIDs for a week.  He is encouraged to ice his incisions for   10-15 minutes multiple times per day.  He does have followup appointments in   the office of [facility] in 2 and 6 weeks' time.  He is to keep those   appointments.  He should call our office or go to the nearest emergency   department with any emergent changes.  The patient was given these discharge   instructions verbally and he voiced understanding of them.    DISCHARGE MEDICATIONS:  In addition to the patient's normal home medications,   he will be discharged home with prescriptions for:  1.  Norco 10/325 one tab p.o. q. 4-6 hours p.r.n. pain, dispensed #84, no   refills.  2.  Flexeril 10 mg 1 tab p.o. t.i.d. p.r.n. spasm, dispensed #90, no refills.  3.  Keflex 500 mg 1 tab p.o. q.i.d. x5 days, dispensed #20, no refills.       ____________________________________     MAVIS MOTA    DD:  01/27/2019 09:14:28  DT:  01/27/2019 09:26:23    D#:  [id number]  Job#:  [id number]    cc: [facility]

## 2019-01-29 ENCOUNTER — HOSPITAL ENCOUNTER (INPATIENT)
Facility: MEDICAL CENTER | Age: 74
LOS: 6 days | DRG: 026 | End: 2019-02-04
Attending: EMERGENCY MEDICINE | Admitting: HOSPITALIST
Payer: COMMERCIAL

## 2019-01-29 PROBLEM — G96.00 POSTOPERATIVE CSF LEAK: Status: ACTIVE | Noted: 2019-01-29

## 2019-01-29 PROBLEM — G97.82 POSTOPERATIVE CSF LEAK: Status: ACTIVE | Noted: 2019-01-29

## 2019-01-29 PROBLEM — I10 ESSENTIAL HYPERTENSION: Status: ACTIVE | Noted: 2019-01-29

## 2019-01-29 PROBLEM — R11.2 NAUSEA & VOMITING: Status: ACTIVE | Noted: 2019-01-29

## 2019-01-29 PROBLEM — K59.01 SLOW TRANSIT CONSTIPATION: Status: ACTIVE | Noted: 2019-01-29

## 2019-01-29 PROBLEM — M15.9 OSTEOARTHRITIS INVOLVING MULTIPLE JOINTS ON BOTH SIDES OF BODY: Status: ACTIVE | Noted: 2019-01-29

## 2019-01-29 LAB
ANION GAP SERPL CALC-SCNC: 10 MMOL/L (ref 0–11.9)
BASOPHILS # BLD AUTO: 0.4 % (ref 0–1.8)
BASOPHILS # BLD: 0.03 K/UL (ref 0–0.12)
BUN SERPL-MCNC: 16 MG/DL (ref 8–22)
CALCIUM SERPL-MCNC: 9.4 MG/DL (ref 8.5–10.5)
CHLORIDE SERPL-SCNC: 102 MMOL/L (ref 96–112)
CO2 SERPL-SCNC: 23 MMOL/L (ref 20–33)
CREAT SERPL-MCNC: 0.8 MG/DL (ref 0.5–1.4)
EOSINOPHIL # BLD AUTO: 0.03 K/UL (ref 0–0.51)
EOSINOPHIL NFR BLD: 0.4 % (ref 0–6.9)
ERYTHROCYTE [DISTWIDTH] IN BLOOD BY AUTOMATED COUNT: 43.7 FL (ref 35.9–50)
GLUCOSE SERPL-MCNC: 158 MG/DL (ref 65–99)
HCT VFR BLD AUTO: 46.6 % (ref 42–52)
HGB BLD-MCNC: 15.9 G/DL (ref 14–18)
IMM GRANULOCYTES # BLD AUTO: 0.02 K/UL (ref 0–0.11)
IMM GRANULOCYTES NFR BLD AUTO: 0.2 % (ref 0–0.9)
LYMPHOCYTES # BLD AUTO: 1.09 K/UL (ref 1–4.8)
LYMPHOCYTES NFR BLD: 13.6 % (ref 22–41)
MCH RBC QN AUTO: 33.1 PG (ref 27–33)
MCHC RBC AUTO-ENTMCNC: 34.1 G/DL (ref 33.7–35.3)
MCV RBC AUTO: 96.9 FL (ref 81.4–97.8)
MONOCYTES # BLD AUTO: 0.77 K/UL (ref 0–0.85)
MONOCYTES NFR BLD AUTO: 9.6 % (ref 0–13.4)
NEUTROPHILS # BLD AUTO: 6.07 K/UL (ref 1.82–7.42)
NEUTROPHILS NFR BLD: 75.8 % (ref 44–72)
NRBC # BLD AUTO: 0 K/UL
NRBC BLD-RTO: 0 /100 WBC
PLATELET # BLD AUTO: 246 K/UL (ref 164–446)
PMV BLD AUTO: 10.6 FL (ref 9–12.9)
POTASSIUM SERPL-SCNC: 4.2 MMOL/L (ref 3.6–5.5)
RBC # BLD AUTO: 4.81 M/UL (ref 4.7–6.1)
SODIUM SERPL-SCNC: 135 MMOL/L (ref 135–145)
WBC # BLD AUTO: 8 K/UL (ref 4.8–10.8)

## 2019-01-29 PROCEDURE — 96375 TX/PRO/DX INJ NEW DRUG ADDON: CPT

## 2019-01-29 PROCEDURE — 770006 HCHG ROOM/CARE - MED/SURG/GYN SEMI*

## 2019-01-29 PROCEDURE — 700102 HCHG RX REV CODE 250 W/ 637 OVERRIDE(OP): Performed by: HOSPITALIST

## 2019-01-29 PROCEDURE — 96374 THER/PROPH/DIAG INJ IV PUSH: CPT

## 2019-01-29 PROCEDURE — 700111 HCHG RX REV CODE 636 W/ 250 OVERRIDE (IP): Performed by: HOSPITALIST

## 2019-01-29 PROCEDURE — A9270 NON-COVERED ITEM OR SERVICE: HCPCS | Performed by: HOSPITALIST

## 2019-01-29 PROCEDURE — 99285 EMERGENCY DEPT VISIT HI MDM: CPT

## 2019-01-29 PROCEDURE — 700111 HCHG RX REV CODE 636 W/ 250 OVERRIDE (IP): Performed by: EMERGENCY MEDICINE

## 2019-01-29 PROCEDURE — 99223 1ST HOSP IP/OBS HIGH 75: CPT | Performed by: HOSPITALIST

## 2019-01-29 PROCEDURE — 80048 BASIC METABOLIC PNL TOTAL CA: CPT

## 2019-01-29 PROCEDURE — 85025 COMPLETE CBC W/AUTO DIFF WBC: CPT

## 2019-01-29 RX ORDER — ATENOLOL 25 MG/1
25 TABLET ORAL DAILY
Status: DISCONTINUED | OUTPATIENT
Start: 2019-01-29 | End: 2019-02-04 | Stop reason: HOSPADM

## 2019-01-29 RX ORDER — ACETAMINOPHEN 325 MG/1
650 TABLET ORAL EVERY 4 HOURS PRN
Status: DISCONTINUED | OUTPATIENT
Start: 2019-01-29 | End: 2019-02-04 | Stop reason: HOSPADM

## 2019-01-29 RX ORDER — POLYETHYLENE GLYCOL 3350 17 G/17G
1 POWDER, FOR SOLUTION ORAL
Status: DISCONTINUED | OUTPATIENT
Start: 2019-01-29 | End: 2019-01-31

## 2019-01-29 RX ORDER — CALCIUM CARBONATE 500(1250)
500 TABLET ORAL
Status: DISCONTINUED | OUTPATIENT
Start: 2019-01-30 | End: 2019-01-29

## 2019-01-29 RX ORDER — AMOXICILLIN 250 MG
2 CAPSULE ORAL 2 TIMES DAILY
Status: DISCONTINUED | OUTPATIENT
Start: 2019-01-30 | End: 2019-01-31

## 2019-01-29 RX ORDER — MORPHINE SULFATE 4 MG/ML
4 INJECTION, SOLUTION INTRAMUSCULAR; INTRAVENOUS ONCE
Status: COMPLETED | OUTPATIENT
Start: 2019-01-29 | End: 2019-01-29

## 2019-01-29 RX ORDER — OXYCODONE HYDROCHLORIDE 5 MG/1
5 TABLET ORAL EVERY 4 HOURS PRN
Status: DISCONTINUED | OUTPATIENT
Start: 2019-01-29 | End: 2019-01-31

## 2019-01-29 RX ORDER — PREDNISONE 5 MG/1
5 TABLET ORAL DAILY
Status: DISCONTINUED | OUTPATIENT
Start: 2019-01-29 | End: 2019-02-04 | Stop reason: HOSPADM

## 2019-01-29 RX ORDER — IBUPROFEN 200 MG
500 CAPSULE ORAL
Status: DISCONTINUED | OUTPATIENT
Start: 2019-01-30 | End: 2019-02-04 | Stop reason: HOSPADM

## 2019-01-29 RX ORDER — BISACODYL 10 MG
10 SUPPOSITORY, RECTAL RECTAL
Status: DISCONTINUED | OUTPATIENT
Start: 2019-01-29 | End: 2019-01-31

## 2019-01-29 RX ORDER — CALCIUM CARBONATE 500(1250)
500 TABLET ORAL
COMMUNITY

## 2019-01-29 RX ORDER — ONDANSETRON 2 MG/ML
4 INJECTION INTRAMUSCULAR; INTRAVENOUS ONCE
Status: COMPLETED | OUTPATIENT
Start: 2019-01-29 | End: 2019-01-29

## 2019-01-29 RX ADMIN — ATENOLOL 25 MG: 25 TABLET ORAL at 19:58

## 2019-01-29 RX ADMIN — PREDNISONE 5 MG: 5 TABLET ORAL at 19:58

## 2019-01-29 RX ADMIN — VITAMIN D, TAB 1000IU (100/BT) 1000 UNITS: 25 TAB at 19:58

## 2019-01-29 RX ADMIN — MORPHINE SULFATE 4 MG: 4 INJECTION INTRAVENOUS at 17:15

## 2019-01-29 RX ADMIN — ONDANSETRON 4 MG: 2 INJECTION INTRAMUSCULAR; INTRAVENOUS at 17:15

## 2019-01-29 ASSESSMENT — COGNITIVE AND FUNCTIONAL STATUS - GENERAL
SUGGESTED CMS G CODE MODIFIER MOBILITY: CH
SUGGESTED CMS G CODE MODIFIER DAILY ACTIVITY: CH
DAILY ACTIVITIY SCORE: 24
MOBILITY SCORE: 24

## 2019-01-29 ASSESSMENT — ENCOUNTER SYMPTOMS
EYES NEGATIVE: 1
MUSCULOSKELETAL NEGATIVE: 1
HEADACHES: 1
CARDIOVASCULAR NEGATIVE: 1
CONSTIPATION: 1
NAUSEA: 1
FOCAL WEAKNESS: 0
RESPIRATORY NEGATIVE: 1
VOMITING: 1
PSYCHIATRIC NEGATIVE: 1

## 2019-01-29 ASSESSMENT — LIFESTYLE VARIABLES
ALCOHOL_USE: NO
PACK_YEARS: 60
EVER_SMOKED: YES

## 2019-01-29 ASSESSMENT — PATIENT HEALTH QUESTIONNAIRE - PHQ9
SUM OF ALL RESPONSES TO PHQ9 QUESTIONS 1 AND 2: 0
1. LITTLE INTEREST OR PLEASURE IN DOING THINGS: NOT AT ALL
2. FEELING DOWN, DEPRESSED, IRRITABLE, OR HOPELESS: NOT AT ALL

## 2019-01-30 ENCOUNTER — APPOINTMENT (OUTPATIENT)
Dept: RADIOLOGY | Facility: MEDICAL CENTER | Age: 74
DRG: 026 | End: 2019-01-30
Attending: NEUROLOGICAL SURGERY
Payer: COMMERCIAL

## 2019-01-30 PROCEDURE — 770006 HCHG ROOM/CARE - MED/SURG/GYN SEMI*

## 2019-01-30 PROCEDURE — 700102 HCHG RX REV CODE 250 W/ 637 OVERRIDE(OP): Performed by: HOSPITALIST

## 2019-01-30 PROCEDURE — 99232 SBSQ HOSP IP/OBS MODERATE 35: CPT | Performed by: INTERNAL MEDICINE

## 2019-01-30 PROCEDURE — A9270 NON-COVERED ITEM OR SERVICE: HCPCS | Performed by: HOSPITALIST

## 2019-01-30 PROCEDURE — 700111 HCHG RX REV CODE 636 W/ 250 OVERRIDE (IP): Performed by: PHYSICIAN ASSISTANT

## 2019-01-30 PROCEDURE — 700111 HCHG RX REV CODE 636 W/ 250 OVERRIDE (IP): Performed by: INTERNAL MEDICINE

## 2019-01-30 PROCEDURE — 72148 MRI LUMBAR SPINE W/O DYE: CPT

## 2019-01-30 PROCEDURE — 700111 HCHG RX REV CODE 636 W/ 250 OVERRIDE (IP): Performed by: HOSPITALIST

## 2019-01-30 RX ORDER — CEFAZOLIN SODIUM 1 G/50ML
1 INJECTION, SOLUTION INTRAVENOUS EVERY 8 HOURS
Status: DISCONTINUED | OUTPATIENT
Start: 2019-01-30 | End: 2019-01-31

## 2019-01-30 RX ORDER — ONDANSETRON 2 MG/ML
4 INJECTION INTRAMUSCULAR; INTRAVENOUS EVERY 8 HOURS PRN
Status: DISCONTINUED | OUTPATIENT
Start: 2019-01-30 | End: 2019-01-31

## 2019-01-30 RX ORDER — CEFAZOLIN SODIUM 1 G/3ML
1 INJECTION, POWDER, FOR SOLUTION INTRAMUSCULAR; INTRAVENOUS EVERY 8 HOURS
Status: DISCONTINUED | OUTPATIENT
Start: 2019-01-30 | End: 2019-01-30

## 2019-01-30 RX ORDER — MORPHINE SULFATE 4 MG/ML
1 INJECTION, SOLUTION INTRAMUSCULAR; INTRAVENOUS EVERY 4 HOURS PRN
Status: DISCONTINUED | OUTPATIENT
Start: 2019-01-30 | End: 2019-01-31

## 2019-01-30 RX ADMIN — PREDNISONE 5 MG: 5 TABLET ORAL at 06:02

## 2019-01-30 RX ADMIN — ATENOLOL 25 MG: 25 TABLET ORAL at 06:02

## 2019-01-30 RX ADMIN — CEFAZOLIN 1 G: 330 INJECTION, POWDER, FOR SOLUTION INTRAMUSCULAR; INTRAVENOUS at 09:58

## 2019-01-30 RX ADMIN — SENNOSIDES AND DOCUSATE SODIUM 2 TABLET: 8.6; 5 TABLET ORAL at 06:01

## 2019-01-30 RX ADMIN — Medication 500 MG: at 08:34

## 2019-01-30 RX ADMIN — MORPHINE SULFATE 1 MG: 4 INJECTION INTRAVENOUS at 17:19

## 2019-01-30 RX ADMIN — VITAMIN D, TAB 1000IU (100/BT) 1000 UNITS: 25 TAB at 06:02

## 2019-01-30 RX ADMIN — SENNOSIDES AND DOCUSATE SODIUM 2 TABLET: 8.6; 5 TABLET ORAL at 20:07

## 2019-01-30 RX ADMIN — MORPHINE SULFATE 1 MG: 4 INJECTION INTRAVENOUS at 22:11

## 2019-01-30 RX ADMIN — MORPHINE SULFATE 1 MG: 4 INJECTION INTRAVENOUS at 13:21

## 2019-01-30 RX ADMIN — CEFAZOLIN SODIUM 1 G: 1 INJECTION, SOLUTION INTRAVENOUS at 17:14

## 2019-01-30 RX ADMIN — ACETAMINOPHEN 650 MG: 325 TABLET, FILM COATED ORAL at 08:34

## 2019-01-30 ASSESSMENT — ENCOUNTER SYMPTOMS
FOCAL WEAKNESS: 0
NAUSEA: 1
FEVER: 0
WEAKNESS: 1
SENSORY CHANGE: 0
HEADACHES: 1
BLURRED VISION: 0
CHILLS: 0
SPEECH CHANGE: 0

## 2019-01-30 NOTE — PROGRESS NOTES
Neurosurgery Progress Note    Subjective:  Hospital Day #1  POD #8 Open L2-5 Laminectomy with Delayed Dural leak,  Readmitted yesterday after being seen in office with Persistent HA and Drainage from 2.5 cm Superior 1.3 incision dehiscence with Serosanguinous d/c.  Pre-surgical Back and leg pain much better.    +Posistion drainage worse when upright.  Dressing saturated so I changed it this AM  Planning for likely return to the OR to repair Pseudomeningocele  Dr Gabriel to decide when Pt will go to OR.  HA better when HOB flat.  No fever         Exam:  Back and Leg pain minimal-better  2.5 cm Superior 13 Incision dehiscence with clear to serosanguinous d/c  Afebrile   Strength 5/5  DTRs +2    BP  Min: 120/63  Max: 157/86  Pulse  Av.9  Min: 53  Max: 84  Resp  Av.3  Min: 15  Max: 20  Temp  Av.4 °C (97.5 °F)  Min: 35.9 °C (96.7 °F)  Max: 36.7 °C (98 °F)  SpO2  Av.7 %  Min: 93 %  Max: 98 %    No Data Recorded    Recent Labs      19   1620   WBC  8.0   RBC  4.81   HEMOGLOBIN  15.9   HEMATOCRIT  46.6   MCV  96.9   MCH  33.1*   MCHC  34.1   RDW  43.7   PLATELETCT  246   MPV  10.6     Recent Labs      19   1620   SODIUM  135   POTASSIUM  4.2   CHLORIDE  102   CO2  23   GLUCOSE  158*   BUN  16   CREATININE  0.80   CALCIUM  9.4               Intake/Output       19 0700 - 19 0659 19 07 - 19 0659       Total  Total       Intake    Total Intake -- -- -- -- -- --       Output    Urine  --  350 350  --  -- --    Urine Void (mL) -- 350 350 -- -- --    Total Output -- 350 350 -- -- --       Net I/O     -- -350 -350 -- -- --            Intake/Output Summary (Last 24 hours) at 19 0811  Last data filed at 19 0606   Gross per 24 hour   Intake                0 ml   Output              350 ml   Net             -350 ml            • atenolol  25 mg DAILY   • predniSONE  5 mg DAILY   • vitamin D  1,000 Units DAILY   • senna-docusate  2  Tab BID    And   • polyethylene glycol/lytes  1 Packet QDAY PRN    And   • magnesium hydroxide  30 mL QDAY PRN    And   • bisacodyl  10 mg QDAY PRN   • acetaminophen  650 mg Q4HRS PRN   • oxyCODONE immediate-release  5 mg Q4HRS PRN   • calcium carbonate  500 mg QDAY with Breakfast       Assessment and Plan:  Hospital Day #1  POD #8 Open L2-5 Laminectomy with Delayed Dural leak,  Will review findings with NBA Gabriel and await MRI.  Added to the OR schedule for tomorrow.  Will keep HOB Flat for now  Added Ancef.  NPO after Midnight tonight

## 2019-01-30 NOTE — ED PROVIDER NOTES
ED Provider Note    Scribed for Isaac Billy D.O. by Natalee Castano. 1/29/2019  4:39 PM    Primary care provider: Candy Rendon M.D.  Means of arrival: walk in   History obtained from: patient   History limited by: none     CHIEF COMPLAINT  Chief Complaint   Patient presents with   • Sent by MD     possible leaking csf fluid post laminectomy a week ago, started leaking sunday   • Nausea   • Head Ache       HPI  Viktor Starr is a 74 y.o. male who presents to the Emergency Department sent by his physician for evaluation of a possible CSF leak which began 2 days ago, status post laminectomy one week ago. Patient reports associated nausea, vomiting, and headache. Additionally, patient reports a decreased appetite and has not had a bowel movement for the past 3 days. There are no exacerbating or alleviating factors identified at this time. No fevers, rashes, loss of strength or sensation to extremities, and urinary incontinence.     REVIEW OF SYSTEMS  Pertinent positives include possible CSF leak from laminectomy site, nausea, vomiting, headache, decreased appetite, constipation. Pertinent negatives include no fevers, rashes, loss of strength or sensation to extremities, and urinary incontinence.  All other systems reviewed and negative.    PAST MEDICAL HISTORY  Past Medical History:   Diagnosis Date   • Arthritis     RA right hand    • Dental disorder     full dentures    • Hypertension    • Pain     lower back        SURGICAL HISTORY  Past Surgical History:   Procedure Laterality Date   • LUMBAR LAMINECTOMY DISKECTOMY  1/22/2019    Procedure: LUMBAR LAMINECTOMY DISKECTOMY- L2-5;  Surgeon: Terrence Gabriel M.D.;  Location: SURGERY [facility];  Service: Neurosurgery   • CARPAL TUNNEL RELEASE Right 10/2017   • TONSILLECTOMY  1952        SOCIAL HISTORY  Social History   Substance Use Topics   • Smoking status: Current Every Day Smoker     Packs/day: 0.50     Years: 50.00   • Smokeless tobacco: Never Used  "  • Alcohol use Yes      Comment: 3 drinks per month       History   Drug Use No       FAMILY HISTORY  Family History   Problem Relation Age of Onset   • Arthritis Mother    • Hypertension Mother    • Hypertension Father        CURRENT MEDICATIONS  Home Medications     Reviewed by Kristan Pradhan R.N. (Registered Nurse) on 01/29/19 at 1608  Med List Status: Unable to Obtain   Medication Last Dose Status   atenolol (TENORMIN) 25 MG Tab  Active   CALCIUM PO  Active   cephALEXin (KEFLEX) 500 MG Cap  Active   Cholecalciferol (VITAMIN D PO)  Active   cyclobenzaprine (FLEXERIL) 5 MG tablet  Active   HYDROcodone/acetaminophen (NORCO)  MG Tab  Active   predniSONE (DELTASONE) 5 MG Tab  Active                ALLERGIES  No Known Allergies    PHYSICAL EXAM  VITAL SIGNS: /99   Pulse 84   Temp 35.9 °C (96.7 °F) (Temporal)   Resp 16   Ht 1.778 m (5' 10\")   Wt 93 kg (205 lb)   SpO2 96%   BMI 29.41 kg/m²     Nursing notes and vitals reviewed.  Constitutional: Well developed, Well nourished, Mild distress, Non-toxic appearance.   Eyes: PERRLA, EOMI, Conjunctiva normal, No discharge.   Cardiovascular: Normal heart rate, Normal rhythm, No murmurs, No rubs, No gallops.   Thorax & Lungs: No respiratory distress, No rales, No rhonchi, No wheezing, No chest tenderness.   Abdomen: Bowel sounds normal, Soft, No tenderness, No guarding, No rebound, No masses, No pulsatile masses.   Skin: Warm, Dry. 7 cm incision to the lumbar spine region with slight bogginess and tenderness, no purulent discharge. ADB pad in place.   Back: 7 cm incision to the lumbar spine region with slight bogginess and tenderness, no purulent discharge.   Musculoskeletal: Intact distal pulses, No edema, No cyanosis, No clubbing. Good range of motion in all major joints.   Neurologic: Alert & oriented x 3, Normal motor function, Normal sensory function. DTRs to L4-S1 are 2/4. Strength is 5/5 with leg lift, plantar flexion, and dorsal flexion. No saddle " anesthesia.   Psychiatric: Affect normal for clinical presentation.    DIAGNOSTIC STUDIES/PROCEDURES    LABS  Results for orders placed or performed during the hospital encounter of 01/29/19   CBC WITH DIFFERENTIAL   Result Value Ref Range    WBC 8.0 4.8 - 10.8 K/uL    RBC 4.81 4.70 - 6.10 M/uL    Hemoglobin 15.9 14.0 - 18.0 g/dL    Hematocrit 46.6 42.0 - 52.0 %    MCV 96.9 81.4 - 97.8 fL    MCH 33.1 (H) 27.0 - 33.0 pg    MCHC 34.1 33.7 - 35.3 g/dL    RDW 43.7 35.9 - 50.0 fL    Platelet Count 246 164 - 446 K/uL    MPV 10.6 9.0 - 12.9 fL    Neutrophils-Polys 75.80 (H) 44.00 - 72.00 %    Lymphocytes 13.60 (L) 22.00 - 41.00 %    Monocytes 9.60 0.00 - 13.40 %    Eosinophils 0.40 0.00 - 6.90 %    Basophils 0.40 0.00 - 1.80 %    Immature Granulocytes 0.20 0.00 - 0.90 %    Nucleated RBC 0.00 /100 WBC    Neutrophils (Absolute) 6.07 1.82 - 7.42 K/uL    Lymphs (Absolute) 1.09 1.00 - 4.80 K/uL    Monos (Absolute) 0.77 0.00 - 0.85 K/uL    Eos (Absolute) 0.03 0.00 - 0.51 K/uL    Baso (Absolute) 0.03 0.00 - 0.12 K/uL    Immature Granulocytes (abs) 0.02 0.00 - 0.11 K/uL    NRBC (Absolute) 0.00 K/uL     All labs reviewed by me.        RADIOLOGY  MR-LUMBAR SPINE-W/O    (Results Pending)     The radiologist's interpretation of all radiological studies have been reviewed by me.    COURSE & MEDICAL DECISION MAKING  Pertinent Labs & Imaging studies reviewed. (See chart for details)    Review of past medical records shows the patient underwent a laminectomy to L2-L5 on the January 22nd.    4:39 PM - Patient seen and examined at bedside. Patient will be treated with morphine 4 mg, Zofran 4 mg. Ordered CBC, BMP to evaluate his symptoms.     4:43 PM- Paged Dr. Gabriel (neurosugery) for a consult.     4:52 PM - I discussed the patient's case and the above findings with Dr. Gabriel (neurosurgery) who kindly agrees to follow the patient and would like for an MR lumbar spine to be ordered.     5:07 PM- Paged the hospitalist for admission.     5:10  PM - Spoke with Dr. Babcock, hospitalist, concerning patient case. Agreed to admit patient for further treatment and evaluation.     This is a charming 74 y.o. male that presents status post laminectomy with dural leak.  The patient has no evidence of infection, no evidence of a fever, normal white blood cell count.  He received morphine as well as Zofran in the emergency department for pain control.  The patient will be admitted to Dr. Babcock for further evaluation.  Non-stat MRI without contrast the lumbar spine has been ordered.  The patient has no evidence of cauda equina syndrome, I do not suspect epidural abscess, epidural hematoma as patient's neurologically intact.  DISPOSITION:  Patient will be admitted to Dr. Babcock, hospitalist, in guarded condition.    FINAL IMPRESSION  Dural leak of the lumbar region     INatalee (Scribe), am scribing for, and in the presence of, Isaac Billy D.O    Electronically signed by: Natalee Castano (Scribe), 1/29/2019    IIsaac D.O. personally performed the services described in this documentation, as scribed by Natalee Castano in my presence, and it is both accurate and complete. C.    The note accurately reflects work and decisions made by me.  Isaac Billy  1/29/2019  5:53 PM

## 2019-01-30 NOTE — ASSESSMENT & PLAN NOTE
Lumbar MRI confirms, 7X3 cm fluid collection  -lay down flat  -IV morphine PRN severe pain, oxy PRN moderate-current regimen appears to be helping quite nicely  -s/p dural repair and redo L4-L5 laminectomy POD#3, improving slowly, liberate physical movement today, PT/OT to see patient today, might need SNF versus HH  -multi modal pain therapy applied as well  -oral keflex per NSGY

## 2019-01-30 NOTE — CARE PLAN
Problem: Communication  Goal: The ability to communicate needs accurately and effectively will improve    Intervention: [place] patient and significant other/support system to call light to alert staff of needs  Able to make needs known, instructed to call for assistance       Problem: Safety  Goal: Will remain free from injury    Intervention: Provide assistance with mobility  Bedrest for now d/t dural leak/HA

## 2019-01-30 NOTE — PROGRESS NOTES
Assumed care of pt. JAY/Ox4, able to make needs known. Room air. Regular diet, NPO tomorrow after breakfast for surgery at 1830. Bedrest for now, HOB up to 30 degrees. PIV in left hand 20g. Call light in reach, bed in low position, will continue hourly rounding.

## 2019-01-30 NOTE — ED TRIAGE NOTES
Chief Complaint   Patient presents with   • Sent by MD     possible leaking csf fluid post laminectomy a week ago, started leaking sunday   • Nausea   • Head Ache     Pt wheeled to triage with above complaints. Pt had L2-L5 laminectomy last Tuesday and dc Sunday. Pt started symptoms day he was d/c. +vomiting. Pt to blue

## 2019-01-30 NOTE — ED NOTES
Med rec updated and complete.  Allergies reviewed.  Pt took no medications today.  No oral antibiotic use in last 30 days at home.

## 2019-01-30 NOTE — H&P
Hospital Medicine History & Physical Note    Date of Service  1/29/2019    Primary Care Physician  Candy Rendon M.D.    Consultants  Dr pedersen ns    Code Status  full    Chief Complaint  Leaking fluid from back    History of Presenting Illness  74 y.o. male who presented 1/29/2019 with past medical history of hypertension , arthritis who had a lumbar laminectomy 1 week ago was referred to the emergency room as they noted fluid leaking from the surgical site.  Patient noted decreased appetite ,  malaise and nausea.  He also been vomiting.  It is bilious in nature.  No fever or chills .mild headache.  He has not had a bowel movement 3 days.  He denied any back pain to me.  Neurosurgeon Dr. Pedersen is aware and recommended a MRI of the lumbar spine and he will follow-up in the hospital    No focal deficit          Review of Systems  Review of Systems   Constitutional: Positive for malaise/fatigue.   HENT: Negative.    Eyes: Negative.    Respiratory: Negative.    Cardiovascular: Negative.    Gastrointestinal: Positive for constipation, nausea and vomiting.   Musculoskeletal: Negative.    Skin: Negative.    Neurological: Positive for headaches. Negative for focal weakness.   Psychiatric/Behavioral: Negative.    All other systems reviewed and are negative.      Past Medical History   has a past medical history of Arthritis; Dental disorder; Hypertension; and Pain.    Surgical History   has a past surgical history that includes carpal tunnel release (Right, 10/2017); tonsillectomy (1952); and lumbar laminectomy diskectomy (1/22/2019).     Family History  family history includes Arthritis in his mother; Hypertension in his father and mother.     Social History   reports that he has been smoking.  He has a 25.00 pack-year smoking history. He has never used smokeless tobacco. He reports that he drinks alcohol. He reports that he does not use drugs.    Allergies  No Known Allergies    Medications  Prior to Admission Medications    Prescriptions Last Dose Informant Patient Reported? Taking?   atenolol (TENORMIN) 25 MG Tab 1/28/2019 at 0900 Patient Yes No   Sig: Take 25 mg by mouth every day.   calcium carbonate (OS-MARCELA 500) 500 MG Tab 1/28/2019 at 0900  Yes Yes   Sig: Take 500 mg by mouth every morning with breakfast.   predniSONE (DELTASONE) 5 MG Tab 1/28/2019 at 0900 Patient Yes No   Sig: Take 5 mg by mouth every day.   vitamin D (CHOLECALCIFEROL) 1000 UNIT Tab 1/28/2019 at 0900  Yes Yes   Sig: Take 1,000 Units by mouth every day.      Facility-Administered Medications: None       Physical Exam  Temp:  [35.9 °C (96.7 °F)-36.1 °C (97 °F)] 36.1 °C (97 °F)  Pulse:  [69-84] 72  Resp:  [16-20] 18  BP: (157)/(86-99) 157/86  SpO2:  [93 %-98 %] 95 %    Physical Exam   Constitutional: He is oriented to person, place, and time. He appears well-developed. No distress.   HENT:   Head: Atraumatic.   Mouth/Throat: No oropharyngeal exudate.   Eyes: Left eye exhibits no discharge.   Neck: No JVD present. No tracheal deviation present. No thyromegaly present.   Cardiovascular: Normal rate and intact distal pulses.  Exam reveals no gallop.    No murmur heard.  Pulmonary/Chest: Effort normal and breath sounds normal. No respiratory distress. He has no wheezes. He has no rales. He exhibits no tenderness.   Abdominal: Soft. Bowel sounds are normal. He exhibits no distension.   Musculoskeletal: Normal range of motion. He exhibits no edema.   Neurological: He is alert and oriented to person, place, and time. No cranial nerve deficit. Coordination normal.   Skin: No rash noted. He is not diaphoretic. No erythema.   Psychiatric: He has a normal mood and affect.       Laboratory:  Recent Labs      01/29/19   1620   WBC  8.0   RBC  4.81   HEMOGLOBIN  15.9   HEMATOCRIT  46.6   MCV  96.9   MCH  33.1*   MCHC  34.1   RDW  43.7   PLATELETCT  246   MPV  10.6     Recent Labs      01/29/19   1620   SODIUM  135   POTASSIUM  4.2   CHLORIDE  102   CO2  23   GLUCOSE  158*   BUN   16   CREATININE  0.80   CALCIUM  9.4     Recent Labs      01/29/19   1620   GLUCOSE  158*                 No results for input(s): TROPONINI in the last 72 hours.    Urinalysis:    No results found     Imaging:  MR-LUMBAR SPINE-W/O    (Results Pending)         Assessment/Plan:  I anticipate this patient will require at least two midnights for appropriate medical management, necessitating inpatient admission.    * Postoperative CSF leak- (present on admission)   Assessment & Plan    Lumbar MRI    Neurosurgery Dr. 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

## 2019-01-30 NOTE — PROGRESS NOTES
Dual RN skin check completed with SAMAN Ramos, at bedside.    Skin fully intact other than recent lumbar surgical incision. Wound edges are approximated, wound bed is pink with a medium amount of clear yellow drainage present on dressing. Surrounding skin intact.     Pt turns himself in bed. Oriented to room/call light/staff. SCDs placed. Fall risk armband placed. Bed locked and in low position, side rails up x2, call light within reach, bed alarm on.

## 2019-01-30 NOTE — PROGRESS NOTES
Patient arrived to floor at 1800 from ED.     Patient A&Ox4, calm, cooperative. VSS on 2L O2. No c/o pain except for mild headache. +Nausea, no vomiting since Zofran IV given. Numbness to bottom of right foot. HRR. Lung sounds diminished - pt smoked 1/2 pack of cigarettes/day x60 years but states he quit right before his surgery last Tuesday 1/22. BS x4, +gas, LBM 1/26 while in the hospital - pt attributes this to decreased appetite. Skin intact other than lumbar surgical incision. No other needs noted at this time.     Bed locked and in low position, side rails up x2, call light within reach, bed alarm placed on pt.

## 2019-01-30 NOTE — PROGRESS NOTES
Pt is a/ox4. Pt denies pain and n/v at this time. Resting quietly in bed. VSS. Will continue to monitor.

## 2019-01-31 LAB
ANION GAP SERPL CALC-SCNC: 5 MMOL/L (ref 0–11.9)
BASOPHILS # BLD AUTO: 0.6 % (ref 0–1.8)
BASOPHILS # BLD: 0.04 K/UL (ref 0–0.12)
BUN SERPL-MCNC: 15 MG/DL (ref 8–22)
CALCIUM SERPL-MCNC: 9.1 MG/DL (ref 8.5–10.5)
CHLORIDE SERPL-SCNC: 103 MMOL/L (ref 96–112)
CO2 SERPL-SCNC: 26 MMOL/L (ref 20–33)
CREAT SERPL-MCNC: 0.83 MG/DL (ref 0.5–1.4)
EOSINOPHIL # BLD AUTO: 0.12 K/UL (ref 0–0.51)
EOSINOPHIL NFR BLD: 1.7 % (ref 0–6.9)
ERYTHROCYTE [DISTWIDTH] IN BLOOD BY AUTOMATED COUNT: 43.3 FL (ref 35.9–50)
GLUCOSE SERPL-MCNC: 90 MG/DL (ref 65–99)
HCT VFR BLD AUTO: 45.1 % (ref 42–52)
HGB BLD-MCNC: 14.9 G/DL (ref 14–18)
IMM GRANULOCYTES # BLD AUTO: 0.02 K/UL (ref 0–0.11)
IMM GRANULOCYTES NFR BLD AUTO: 0.3 % (ref 0–0.9)
LYMPHOCYTES # BLD AUTO: 1.67 K/UL (ref 1–4.8)
LYMPHOCYTES NFR BLD: 24 % (ref 22–41)
MCH RBC QN AUTO: 32.3 PG (ref 27–33)
MCHC RBC AUTO-ENTMCNC: 33 G/DL (ref 33.7–35.3)
MCV RBC AUTO: 97.6 FL (ref 81.4–97.8)
MONOCYTES # BLD AUTO: 0.84 K/UL (ref 0–0.85)
MONOCYTES NFR BLD AUTO: 12.1 % (ref 0–13.4)
NEUTROPHILS # BLD AUTO: 4.27 K/UL (ref 1.82–7.42)
NEUTROPHILS NFR BLD: 61.3 % (ref 44–72)
NRBC # BLD AUTO: 0 K/UL
NRBC BLD-RTO: 0 /100 WBC
PLATELET # BLD AUTO: 215 K/UL (ref 164–446)
PMV BLD AUTO: 10.2 FL (ref 9–12.9)
POTASSIUM SERPL-SCNC: 3.8 MMOL/L (ref 3.6–5.5)
RBC # BLD AUTO: 4.62 M/UL (ref 4.7–6.1)
SODIUM SERPL-SCNC: 134 MMOL/L (ref 135–145)
WBC # BLD AUTO: 7 K/UL (ref 4.8–10.8)

## 2019-01-31 PROCEDURE — 160036 HCHG PACU - EA ADDL 30 MINS PHASE I: Performed by: NEUROLOGICAL SURGERY

## 2019-01-31 PROCEDURE — 700102 HCHG RX REV CODE 250 W/ 637 OVERRIDE(OP): Performed by: ANESTHESIOLOGY

## 2019-01-31 PROCEDURE — 500885 HCHG PACK, JACKSON TABLE: Performed by: NEUROLOGICAL SURGERY

## 2019-01-31 PROCEDURE — 500423 HCHG DRESSING, ABD COMBINE: Performed by: NEUROLOGICAL SURGERY

## 2019-01-31 PROCEDURE — A9270 NON-COVERED ITEM OR SERVICE: HCPCS | Performed by: PHYSICIAN ASSISTANT

## 2019-01-31 PROCEDURE — 110371 HCHG SHELL REV 272: Performed by: NEUROLOGICAL SURGERY

## 2019-01-31 PROCEDURE — A9270 NON-COVERED ITEM OR SERVICE: HCPCS | Performed by: HOSPITALIST

## 2019-01-31 PROCEDURE — 110454 HCHG SHELL REV 250: Performed by: NEUROLOGICAL SURGERY

## 2019-01-31 PROCEDURE — 160009 HCHG ANES TIME/MIN: Performed by: NEUROLOGICAL SURGERY

## 2019-01-31 PROCEDURE — 80048 BASIC METABOLIC PNL TOTAL CA: CPT

## 2019-01-31 PROCEDURE — 85025 COMPLETE CBC W/AUTO DIFF WBC: CPT

## 2019-01-31 PROCEDURE — 01NB0ZZ RELEASE LUMBAR NERVE, OPEN APPROACH: ICD-10-PCS | Performed by: NEUROLOGICAL SURGERY

## 2019-01-31 PROCEDURE — 700102 HCHG RX REV CODE 250 W/ 637 OVERRIDE(OP): Performed by: PHYSICIAN ASSISTANT

## 2019-01-31 PROCEDURE — 00U20KZ SUPPLEMENT DURA MATER WITH NONAUTOLOGOUS TISSUE SUBSTITUTE, OPEN APPROACH: ICD-10-PCS | Performed by: NEUROLOGICAL SURGERY

## 2019-01-31 PROCEDURE — 770006 HCHG ROOM/CARE - MED/SURG/GYN SEMI*

## 2019-01-31 PROCEDURE — 500331 HCHG COTTONOID, SURG PATTIE: Performed by: NEUROLOGICAL SURGERY

## 2019-01-31 PROCEDURE — 700111 HCHG RX REV CODE 636 W/ 250 OVERRIDE (IP): Performed by: INTERNAL MEDICINE

## 2019-01-31 PROCEDURE — 700111 HCHG RX REV CODE 636 W/ 250 OVERRIDE (IP)

## 2019-01-31 PROCEDURE — 700111 HCHG RX REV CODE 636 W/ 250 OVERRIDE (IP): Performed by: PHYSICIAN ASSISTANT

## 2019-01-31 PROCEDURE — 700102 HCHG RX REV CODE 250 W/ 637 OVERRIDE(OP): Performed by: HOSPITALIST

## 2019-01-31 PROCEDURE — 700111 HCHG RX REV CODE 636 W/ 250 OVERRIDE (IP): Performed by: ANESTHESIOLOGY

## 2019-01-31 PROCEDURE — 501838 HCHG SUTURE GENERAL: Performed by: NEUROLOGICAL SURGERY

## 2019-01-31 PROCEDURE — 501837 HCHG SUTURE CV: Performed by: NEUROLOGICAL SURGERY

## 2019-01-31 PROCEDURE — 36415 COLL VENOUS BLD VENIPUNCTURE: CPT

## 2019-01-31 PROCEDURE — A9270 NON-COVERED ITEM OR SERVICE: HCPCS | Performed by: ANESTHESIOLOGY

## 2019-01-31 PROCEDURE — 160048 HCHG OR STATISTICAL LEVEL 1-5: Performed by: NEUROLOGICAL SURGERY

## 2019-01-31 PROCEDURE — 700111 HCHG RX REV CODE 636 W/ 250 OVERRIDE (IP): Performed by: HOSPITALIST

## 2019-01-31 PROCEDURE — 99232 SBSQ HOSP IP/OBS MODERATE 35: CPT | Performed by: INTERNAL MEDICINE

## 2019-01-31 PROCEDURE — 700101 HCHG RX REV CODE 250: Performed by: PHYSICIAN ASSISTANT

## 2019-01-31 PROCEDURE — 160035 HCHG PACU - 1ST 60 MINS PHASE I: Performed by: NEUROLOGICAL SURGERY

## 2019-01-31 PROCEDURE — 160002 HCHG RECOVERY MINUTES (STAT): Performed by: NEUROLOGICAL SURGERY

## 2019-01-31 PROCEDURE — 700112 HCHG RX REV CODE 229: Performed by: PHYSICIAN ASSISTANT

## 2019-01-31 PROCEDURE — 160029 HCHG SURGERY MINUTES - 1ST 30 MINS LEVEL 4: Performed by: NEUROLOGICAL SURGERY

## 2019-01-31 PROCEDURE — 700101 HCHG RX REV CODE 250

## 2019-01-31 PROCEDURE — 160041 HCHG SURGERY MINUTES - EA ADDL 1 MIN LEVEL 4: Performed by: NEUROLOGICAL SURGERY

## 2019-01-31 PROCEDURE — 502240 HCHG MISC OR SUPPLY RC 0272: Performed by: NEUROLOGICAL SURGERY

## 2019-01-31 DEVICE — DURASEAL SEALANT SYSTEM 5ML - (5/BX): Type: IMPLANTABLE DEVICE | Site: BACK | Status: FUNCTIONAL

## 2019-01-31 DEVICE — GRAFT DURAMATRIX ONLAY PLUS 1IN X 3IN OR 2.75CM X 7.5CM: Type: IMPLANTABLE DEVICE | Site: BACK | Status: FUNCTIONAL

## 2019-01-31 RX ORDER — MORPHINE SULFATE 4 MG/ML
2-4 INJECTION, SOLUTION INTRAMUSCULAR; INTRAVENOUS
Status: DISCONTINUED | OUTPATIENT
Start: 2019-01-31 | End: 2019-02-04 | Stop reason: HOSPADM

## 2019-01-31 RX ORDER — OXYCODONE HYDROCHLORIDE 10 MG/1
10 TABLET ORAL
Status: DISCONTINUED | OUTPATIENT
Start: 2019-01-31 | End: 2019-02-04 | Stop reason: HOSPADM

## 2019-01-31 RX ORDER — ONDANSETRON 4 MG/1
4 TABLET, ORALLY DISINTEGRATING ORAL EVERY 4 HOURS PRN
Status: DISCONTINUED | OUTPATIENT
Start: 2019-01-31 | End: 2019-02-04 | Stop reason: HOSPADM

## 2019-01-31 RX ORDER — AMOXICILLIN 250 MG
1 CAPSULE ORAL NIGHTLY
Status: DISCONTINUED | OUTPATIENT
Start: 2019-01-31 | End: 2019-02-04 | Stop reason: HOSPADM

## 2019-01-31 RX ORDER — OXYCODONE HCL 5 MG/5 ML
10 SOLUTION, ORAL ORAL
Status: COMPLETED | OUTPATIENT
Start: 2019-01-31 | End: 2019-01-31

## 2019-01-31 RX ORDER — DIPHENHYDRAMINE HYDROCHLORIDE 50 MG/ML
12.5 INJECTION INTRAMUSCULAR; INTRAVENOUS
Status: DISCONTINUED | OUTPATIENT
Start: 2019-01-31 | End: 2019-01-31 | Stop reason: HOSPADM

## 2019-01-31 RX ORDER — METHOCARBAMOL 750 MG/1
750 TABLET, FILM COATED ORAL 4 TIMES DAILY PRN
Status: DISCONTINUED | OUTPATIENT
Start: 2019-01-31 | End: 2019-02-01

## 2019-01-31 RX ORDER — SODIUM CHLORIDE, SODIUM LACTATE, POTASSIUM CHLORIDE, CALCIUM CHLORIDE 600; 310; 30; 20 MG/100ML; MG/100ML; MG/100ML; MG/100ML
INJECTION, SOLUTION INTRAVENOUS CONTINUOUS
Status: DISCONTINUED | OUTPATIENT
Start: 2019-01-31 | End: 2019-01-31 | Stop reason: HOSPADM

## 2019-01-31 RX ORDER — POLYETHYLENE GLYCOL 3350 17 G/17G
1 POWDER, FOR SOLUTION ORAL 2 TIMES DAILY PRN
Status: DISCONTINUED | OUTPATIENT
Start: 2019-01-31 | End: 2019-02-04 | Stop reason: HOSPADM

## 2019-01-31 RX ORDER — HYDRALAZINE HYDROCHLORIDE 20 MG/ML
10 INJECTION INTRAMUSCULAR; INTRAVENOUS
Status: DISCONTINUED | OUTPATIENT
Start: 2019-01-31 | End: 2019-02-04 | Stop reason: HOSPADM

## 2019-01-31 RX ORDER — MEPERIDINE HYDROCHLORIDE 25 MG/ML
6.25 INJECTION INTRAMUSCULAR; INTRAVENOUS; SUBCUTANEOUS
Status: DISCONTINUED | OUTPATIENT
Start: 2019-01-31 | End: 2019-01-31 | Stop reason: HOSPADM

## 2019-01-31 RX ORDER — DIPHENHYDRAMINE HCL 25 MG
25 TABLET ORAL EVERY 6 HOURS PRN
Status: DISCONTINUED | OUTPATIENT
Start: 2019-01-31 | End: 2019-02-04 | Stop reason: HOSPADM

## 2019-01-31 RX ORDER — CEPHALEXIN 500 MG/1
500 CAPSULE ORAL EVERY 6 HOURS
Status: DISCONTINUED | OUTPATIENT
Start: 2019-02-01 | End: 2019-02-04 | Stop reason: HOSPADM

## 2019-01-31 RX ORDER — AMOXICILLIN 250 MG
1 CAPSULE ORAL
Status: DISCONTINUED | OUTPATIENT
Start: 2019-01-31 | End: 2019-02-04 | Stop reason: HOSPADM

## 2019-01-31 RX ORDER — SODIUM CHLORIDE, SODIUM LACTATE, POTASSIUM CHLORIDE, AND CALCIUM CHLORIDE .6; .31; .03; .02 G/100ML; G/100ML; G/100ML; G/100ML
IRRIGANT IRRIGATION
Status: DISCONTINUED | OUTPATIENT
Start: 2019-01-31 | End: 2019-01-31 | Stop reason: HOSPADM

## 2019-01-31 RX ORDER — OXYCODONE HCL 5 MG/5 ML
5 SOLUTION, ORAL ORAL
Status: COMPLETED | OUTPATIENT
Start: 2019-01-31 | End: 2019-01-31

## 2019-01-31 RX ORDER — DIPHENHYDRAMINE HYDROCHLORIDE 50 MG/ML
25 INJECTION INTRAMUSCULAR; INTRAVENOUS EVERY 6 HOURS PRN
Status: DISCONTINUED | OUTPATIENT
Start: 2019-01-31 | End: 2019-02-04 | Stop reason: HOSPADM

## 2019-01-31 RX ORDER — ALPRAZOLAM 0.25 MG/1
0.25 TABLET ORAL 2 TIMES DAILY PRN
Status: DISCONTINUED | OUTPATIENT
Start: 2019-01-31 | End: 2019-02-04 | Stop reason: HOSPADM

## 2019-01-31 RX ORDER — HYDROMORPHONE HYDROCHLORIDE 1 MG/ML
0.1 INJECTION, SOLUTION INTRAMUSCULAR; INTRAVENOUS; SUBCUTANEOUS
Status: DISCONTINUED | OUTPATIENT
Start: 2019-01-31 | End: 2019-01-31 | Stop reason: HOSPADM

## 2019-01-31 RX ORDER — BUPIVACAINE HYDROCHLORIDE AND EPINEPHRINE 5; 5 MG/ML; UG/ML
INJECTION, SOLUTION EPIDURAL; INTRACAUDAL; PERINEURAL
Status: DISCONTINUED | OUTPATIENT
Start: 2019-01-31 | End: 2019-01-31 | Stop reason: HOSPADM

## 2019-01-31 RX ORDER — BISACODYL 10 MG
10 SUPPOSITORY, RECTAL RECTAL
Status: DISCONTINUED | OUTPATIENT
Start: 2019-01-31 | End: 2019-02-04 | Stop reason: HOSPADM

## 2019-01-31 RX ORDER — CALCIUM CARBONATE 500 MG/1
500 TABLET, CHEWABLE ORAL 2 TIMES DAILY
Status: DISCONTINUED | OUTPATIENT
Start: 2019-01-31 | End: 2019-02-04 | Stop reason: HOSPADM

## 2019-01-31 RX ORDER — BACITRACIN 50000 [IU]/1
INJECTION, POWDER, FOR SOLUTION INTRAMUSCULAR
Status: DISCONTINUED | OUTPATIENT
Start: 2019-01-31 | End: 2019-01-31 | Stop reason: HOSPADM

## 2019-01-31 RX ORDER — ONDANSETRON 2 MG/ML
4 INJECTION INTRAMUSCULAR; INTRAVENOUS EVERY 4 HOURS PRN
Status: DISCONTINUED | OUTPATIENT
Start: 2019-01-31 | End: 2019-02-04 | Stop reason: HOSPADM

## 2019-01-31 RX ORDER — DOCUSATE SODIUM 100 MG/1
100 CAPSULE, LIQUID FILLED ORAL 2 TIMES DAILY
Status: DISCONTINUED | OUTPATIENT
Start: 2019-01-31 | End: 2019-02-04 | Stop reason: HOSPADM

## 2019-01-31 RX ORDER — ONDANSETRON 2 MG/ML
4 INJECTION INTRAMUSCULAR; INTRAVENOUS
Status: COMPLETED | OUTPATIENT
Start: 2019-01-31 | End: 2019-01-31

## 2019-01-31 RX ORDER — HYDROMORPHONE HYDROCHLORIDE 1 MG/ML
0.2 INJECTION, SOLUTION INTRAMUSCULAR; INTRAVENOUS; SUBCUTANEOUS
Status: DISCONTINUED | OUTPATIENT
Start: 2019-01-31 | End: 2019-01-31 | Stop reason: HOSPADM

## 2019-01-31 RX ORDER — ENEMA 19; 7 G/133ML; G/133ML
1 ENEMA RECTAL
Status: DISCONTINUED | OUTPATIENT
Start: 2019-01-31 | End: 2019-02-04 | Stop reason: HOSPADM

## 2019-01-31 RX ORDER — CEFAZOLIN SODIUM 1 G/50ML
1 INJECTION, SOLUTION INTRAVENOUS EVERY 8 HOURS
Status: COMPLETED | OUTPATIENT
Start: 2019-01-31 | End: 2019-02-01

## 2019-01-31 RX ORDER — OXYCODONE HYDROCHLORIDE 5 MG/1
5 TABLET ORAL
Status: DISCONTINUED | OUTPATIENT
Start: 2019-01-31 | End: 2019-02-04 | Stop reason: HOSPADM

## 2019-01-31 RX ORDER — HYDROMORPHONE HYDROCHLORIDE 1 MG/ML
0.4 INJECTION, SOLUTION INTRAMUSCULAR; INTRAVENOUS; SUBCUTANEOUS
Status: DISCONTINUED | OUTPATIENT
Start: 2019-01-31 | End: 2019-01-31 | Stop reason: HOSPADM

## 2019-01-31 RX ORDER — HALOPERIDOL 5 MG/ML
1 INJECTION INTRAMUSCULAR
Status: DISCONTINUED | OUTPATIENT
Start: 2019-01-31 | End: 2019-01-31 | Stop reason: HOSPADM

## 2019-01-31 RX ORDER — ACETAMINOPHEN 325 MG/1
650 TABLET ORAL EVERY 6 HOURS
Status: DISCONTINUED | OUTPATIENT
Start: 2019-02-01 | End: 2019-02-04 | Stop reason: HOSPADM

## 2019-01-31 RX ORDER — DEXTROSE MONOHYDRATE, SODIUM CHLORIDE, AND POTASSIUM CHLORIDE 50; 1.49; 4.5 G/1000ML; G/1000ML; G/1000ML
INJECTION, SOLUTION INTRAVENOUS CONTINUOUS
Status: DISCONTINUED | OUTPATIENT
Start: 2019-01-31 | End: 2019-02-03

## 2019-01-31 RX ORDER — HYDRALAZINE HYDROCHLORIDE 20 MG/ML
5 INJECTION INTRAMUSCULAR; INTRAVENOUS
Status: DISCONTINUED | OUTPATIENT
Start: 2019-01-31 | End: 2019-01-31 | Stop reason: HOSPADM

## 2019-01-31 RX ADMIN — Medication 500 MG: at 08:23

## 2019-01-31 RX ADMIN — Medication 1 TABLET: at 21:45

## 2019-01-31 RX ADMIN — ATENOLOL 25 MG: 25 TABLET ORAL at 05:21

## 2019-01-31 RX ADMIN — SENNOSIDES AND DOCUSATE SODIUM 2 TABLET: 8.6; 5 TABLET ORAL at 05:21

## 2019-01-31 RX ADMIN — VITAMIN D, TAB 1000IU (100/BT) 1000 UNITS: 25 TAB at 05:21

## 2019-01-31 RX ADMIN — CEFAZOLIN SODIUM 1 G: 1 INJECTION, SOLUTION INTRAVENOUS at 23:16

## 2019-01-31 RX ADMIN — CEFAZOLIN SODIUM 1 G: 1 INJECTION, SOLUTION INTRAVENOUS at 13:49

## 2019-01-31 RX ADMIN — POTASSIUM CHLORIDE, DEXTROSE MONOHYDRATE AND SODIUM CHLORIDE: 150; 5; 450 INJECTION, SOLUTION INTRAVENOUS at 21:44

## 2019-01-31 RX ADMIN — FENTANYL CITRATE 50 MCG: 50 INJECTION, SOLUTION INTRAMUSCULAR; INTRAVENOUS at 20:12

## 2019-01-31 RX ADMIN — ANTACID TABLETS 500 MG: 500 TABLET, CHEWABLE ORAL at 21:45

## 2019-01-31 RX ADMIN — MORPHINE SULFATE 1 MG: 4 INJECTION INTRAVENOUS at 13:49

## 2019-01-31 RX ADMIN — MORPHINE SULFATE 1 MG: 4 INJECTION INTRAVENOUS at 05:21

## 2019-01-31 RX ADMIN — DOCUSATE SODIUM 100 MG: 100 CAPSULE, LIQUID FILLED ORAL at 21:45

## 2019-01-31 RX ADMIN — CEFAZOLIN SODIUM 1 G: 1 INJECTION, SOLUTION INTRAVENOUS at 05:21

## 2019-01-31 RX ADMIN — OXYCODONE HYDROCHLORIDE 10 MG: 5 SOLUTION ORAL at 20:11

## 2019-01-31 RX ADMIN — MORPHINE SULFATE 1 MG: 4 INJECTION INTRAVENOUS at 09:18

## 2019-01-31 RX ADMIN — FENTANYL CITRATE 50 MCG: 50 INJECTION, SOLUTION INTRAMUSCULAR; INTRAVENOUS at 20:16

## 2019-01-31 RX ADMIN — ACETAMINOPHEN 650 MG: 325 TABLET, FILM COATED ORAL at 23:16

## 2019-01-31 RX ADMIN — HYDROMORPHONE HYDROCHLORIDE 0.4 MG: 1 INJECTION, SOLUTION INTRAMUSCULAR; INTRAVENOUS; SUBCUTANEOUS at 20:26

## 2019-01-31 RX ADMIN — PREDNISONE 5 MG: 5 TABLET ORAL at 05:21

## 2019-01-31 ASSESSMENT — ENCOUNTER SYMPTOMS
FOCAL WEAKNESS: 0
NAUSEA: 1
HEADACHES: 1
FEVER: 0
ABDOMINAL PAIN: 0
WEAKNESS: 1
BLURRED VISION: 0
VOMITING: 0
SPEECH CHANGE: 0
SENSORY CHANGE: 0

## 2019-01-31 NOTE — CARE PLAN
Problem: Safety  Goal: Will remain free from falls  Outcome: PROGRESSING AS EXPECTED  Pt on bedrest at this time until surgery is completed. Fall precautions and interventions in place, pt educated.    Problem: Pain Management  Goal: Pain level will decrease to patient's comfort goal  Outcome: PROGRESSING AS EXPECTED  Pt medicated for pain per orders.

## 2019-01-31 NOTE — CARE PLAN
Problem: Safety  Goal: Will remain free from injury  Outcome: PROGRESSING AS EXPECTED  Fall precautions in place. Bed locked and in low position. Pt calling appropriately for assistance     Problem: Bowel/Gastric:  Goal: Normal bowel function is maintained or improved  Outcome: PROGRESSING AS EXPECTED  Pt has not had a BM this shift. Stool softners given.

## 2019-01-31 NOTE — PROGRESS NOTES
Pt A&Ox4  Vitals stable  Pt to be NPO after breakfast for procedure scheduled at 1830 today.   HOB <30 degrees  Pt strict bedrest  Pt has compliant of headache with relief with morphine  Calling appropriately for assistance  Fall precautions in place  Hourly rounding completed

## 2019-01-31 NOTE — PROGRESS NOTES
Neurosurgery Progress Note    Subjective:  Hospital Day #2  POD #9 Open L2-5 Laminectomy with Delayed CSF leak,  C/o headache and intermittent neausea  Tolerable back pain  No radicular leg pain  Right S1 foot numbness  No saddle anesthesia   Lumbar MRI shows small fluid collection with moderate central canal stenosis L3-5       Exam:  Lower motor 5/5  Decreased right S1 sensroy   Wound wet  Very slight wound dehiscence     BP  Min: 120/63  Max: 144/83  Pulse  Av  Min: 53  Max: 61  Resp  Avg: 15.7  Min: 15  Max: 16  Temp  Av.4 °C (97.5 °F)  Min: 35.9 °C (96.7 °F)  Max: 36.7 °C (98.1 °F)  SpO2  Av %  Min: 90 %  Max: 94 %    No Data Recorded    Recent Labs      19   1620  19   0344   WBC  8.0  7.0   RBC  4.81  4.62*   HEMOGLOBIN  15.9  14.9   HEMATOCRIT  46.6  45.1   MCV  96.9  97.6   MCH  33.1*  32.3   MCHC  34.1  33.0*   RDW  43.7  43.3   PLATELETCT  246  215   MPV  10.6  10.2     Recent Labs      19   1620  19   0344   SODIUM  135  134*   POTASSIUM  4.2  3.8   CHLORIDE  102  103   CO2  23  26   GLUCOSE  158*  90   BUN  16  15   CREATININE  0.80  0.83   CALCIUM  9.4  9.1               Intake/Output       19 07 - 19 0659 19 07 - 19 0659       3557-3354 Total  2964-0382 Total       Intake    P.O.  250  -- 250  --  -- --    P.O. 250 -- 250 -- -- --    Total Intake 250 -- 250 -- -- --       Output    Urine  450  475 925  --  -- --    Urine Void (mL) 450 475 925 -- -- --    Total Output 450 475 925 -- -- --       Net I/O     -200 -475 -675 -- -- --            Intake/Output Summary (Last 24 hours) at 19 0756  Last data filed at 19 0400   Gross per 24 hour   Intake              250 ml   Output              925 ml   Net             -675 ml            • ondansetron  4 mg Q8HRS PRN   • morphine injection  1 mg Q4HRS PRN   • ceFAZolin  1 g Q8HRS   • atenolol  25 mg DAILY   • predniSONE  5 mg DAILY   • vitamin D  1,000 Units DAILY    • senna-docusate  2 Tab BID    And   • polyethylene glycol/lytes  1 Packet QDAY PRN    And   • magnesium hydroxide  30 mL QDAY PRN    And   • bisacodyl  10 mg QDAY PRN   • acetaminophen  650 mg Q4HRS PRN   • oxyCODONE immediate-release  5 mg Q4HRS PRN   • calcium carbonate  500 mg QDAY with Breakfast       Assessment and Plan:  Hospital Day #2  POD #9 Open L2-5 Laminectomy with Delayed Dural leak,  Dr. Gabriel planning for wound exploration with durotomy today   Keep NPO   Labs / vitals fine for surgery   All questions answered this morning

## 2019-01-31 NOTE — PROGRESS NOTES
[facility] Medicine Daily Progress Note    Date of Service  1/31/2019    Chief Complaint  74 y.o. male admitted 1/29/2019 with likely post operative CSF leak    Hospital Course  See below    Interval Problem Update  Headache-feels ok today, IV morphine helps the most with HA and pain. Minimal back pain and denies new numbness or weakness of the legs except for below some of the toes on the R foot which has been there since the initial surgery, POD#9.    Consultants/Specialty  NSGY    Code Status  fcfc    Disposition  Neuro    Review of Systems  Review of Systems   Constitutional: Negative for fever.   HENT: Negative for hearing loss.    Eyes: Negative for blurred vision.   Gastrointestinal: Positive for nausea (less intense today). Negative for abdominal pain and vomiting.   Neurological: Positive for weakness (no appreciable improvement) and headaches (positional, improved today). Negative for sensory change, speech change and focal weakness.   All other systems reviewed and are negative.       Physical Exam  Temp:  [35.9 °C (96.7 °F)-36.7 °C (98.1 °F)] 36.2 °C (97.1 °F)  Pulse:  [52-61] 52  Resp:  [16] 16  BP: (133-144)/(57-88) 144/88  SpO2:  [90 %-93 %] 93 %    Physical Exam   Constitutional: He is oriented to person, place, and time. He appears well-developed and well-nourished. No distress.   HENT:   Head: Normocephalic and atraumatic.   Mouth/Throat: No oropharyngeal exudate.   Eyes: Pupils are equal, round, and reactive to light. Right eye exhibits no discharge. Left eye exhibits no discharge.   Neck: Normal range of motion. Neck supple.   Cardiovascular: Normal rate, regular rhythm, normal heart sounds and intact distal pulses.    Pulmonary/Chest: Effort normal and breath sounds normal. No stridor. He has no wheezes. He has no rales.   Abdominal: Soft. Bowel sounds are normal. He exhibits no distension.   Musculoskeletal: Normal range of motion. He exhibits no tenderness.   Mild leaking in lumbar region, gauze  mildly soaked, no clear blood   Neurological: He is alert and oriented to person, place, and time. No cranial nerve deficit.   Skin: Skin is warm and dry. No rash noted.   Psychiatric: He has a normal mood and affect.   Nursing note and vitals reviewed.      Fluids    Intake/Output Summary (Last 24 hours) at 01/31/19 1205  Last data filed at 01/31/19 0830   Gross per 24 hour   Intake              490 ml   Output              825 ml   Net             -335 ml       Laboratory  Recent Labs      01/29/19   1620  01/31/19   0344   WBC  8.0  7.0   RBC  4.81  4.62*   HEMOGLOBIN  15.9  14.9   HEMATOCRIT  46.6  45.1   MCV  96.9  97.6   MCH  33.1*  32.3   MCHC  34.1  33.0*   RDW  43.7  43.3   PLATELETCT  246  215   MPV  10.6  10.2     Recent Labs      01/29/19   1620  01/31/19   0344   SODIUM  135  134*   POTASSIUM  4.2  3.8   CHLORIDE  102  103   CO2  23  26   GLUCOSE  158*  90   BUN  16  15   CREATININE  0.80  0.83   CALCIUM  9.4  9.1                   Imaging  MR-LUMBAR SPINE-W/O   Final Result      1.  There is an approximately 7.9 x 1.9 cm sized posterior extradural postsurgical fluid collection at the levels of L2-3, L3-4 and L4-5. The differential diagnosis includes postsurgical seroma and pseudomeningocele.   2.  Severe effacement of the thecal sac at the levels of L3 and L3-4 secondary to the disc bulge and fluid collection.   3.  Postsurgical fluid collection in the subcutaneous soft tissue in the back.   4.  Degenerative disease as described above.           Assessment/Plan  * Postoperative CSF leak- (present on admission)   Assessment & Plan    Lumbar MRI confirms, 7X3 cm fluid collection  -lay down flat  -IV morphine PRN severe pain, oxy PRN moderate-current regimen appears to be helping quite nicely  -surgery today     Nausea & vomiting- (present on admission)   Assessment & Plan    PRN IV and Zofran     Slow transit constipation- (present on admission)   Assessment & Plan    Bowel protocol    Minimize  narcotic     Osteoarthritis involving multiple joints on both sides of body- (present on admission)   Assessment & Plan    Prednisone po     Essential hypertension- (present on admission)   Assessment & Plan    Atenolol          VTE prophylaxis: SCD's

## 2019-01-31 NOTE — PROGRESS NOTES
Neurosurgery Progress Note    Subjective:  Hospital Day #2  POD #9 Open L2-5 Laminectomy with Delayed Dural leak,  Readmitted with Persistent HA and Drainage from 2.5 cm Superior 1.3 incision dehiscence with Serosanguinous d/c.  Pre-surgical Back and leg pain much better.    +Posistion drainage worse when upright, HOB lat since yesterday.  To OR today---NPO after breakfast andonAncef       Exam:  Back and Leg pain minimal-better  2.5 cm Superior 13 Incision dehiscence with clear to serosanguinous d/c  Afebrile   Strength 5/5  DTRs +2    BP  Min: 120/63  Max: 144/83  Pulse  Av  Min: 53  Max: 61  Resp  Avg: 15.7  Min: 15  Max: 16  Temp  Av.4 °C (97.5 °F)  Min: 35.9 °C (96.7 °F)  Max: 36.7 °C (98.1 °F)  SpO2  Av %  Min: 90 %  Max: 94 %    No Data Recorded    Recent Labs      19   1620  19   0344   WBC  8.0  7.0   RBC  4.81  4.62*   HEMOGLOBIN  15.9  14.9   HEMATOCRIT  46.6  45.1   MCV  96.9  97.6   MCH  33.1*  32.3   MCHC  34.1  33.0*   RDW  43.7  43.3   PLATELETCT  246  215   MPV  10.6  10.2     Recent Labs      19   1620  19   0344   SODIUM  135  134*   POTASSIUM  4.2  3.8   CHLORIDE  102  103   CO2  23  26   GLUCOSE  158*  90   BUN  16  15   CREATININE  0.80  0.83   CALCIUM  9.4  9.1               Intake/Output       19 07 - 19 0659 19 07 - 19 0659       Total  Total       Intake    P.O.  250  -- 250  --  -- --    P.O. 250 -- 250 -- -- --    Total Intake 250 -- 250 -- -- --       Output    Urine  450  475 925  --  -- --    Urine Void (mL) 450 475 925 -- -- --    Total Output 450 475 925 -- -- --       Net I/O     -200 -475 -675 -- -- --            Intake/Output Summary (Last 24 hours) at 19 8277  Last data filed at 19 0400   Gross per 24 hour   Intake              250 ml   Output              925 ml   Net             -675 ml            • ondansetron  4 mg Q8HRS PRN   • morphine injection  1 mg Q4HRS  PRN   • ceFAZolin  1 g Q8HRS   • atenolol  25 mg DAILY   • predniSONE  5 mg DAILY   • vitamin D  1,000 Units DAILY   • senna-docusate  2 Tab BID    And   • polyethylene glycol/lytes  1 Packet QDAY PRN    And   • magnesium hydroxide  30 mL QDAY PRN    And   • bisacodyl  10 mg QDAY PRN   • acetaminophen  650 mg Q4HRS PRN   • oxyCODONE immediate-release  5 mg Q4HRS PRN   • calcium carbonate  500 mg QDAY with Breakfast       Assessment and Plan:  Hospital Day #2  POD #9 Open L2-5 Laminectomy with Delayed Dural leak,  Added to the OR schedule today at 1800.  Will keep HOB flat, Continue Ancef.and NPO after breakfast

## 2019-01-31 NOTE — PROGRESS NOTES
[facility] Medicine Daily Progress Note    Date of Service  1/30/2019    Chief Complaint  74 y.o. male admitted 1/29/2019 with likely post operative CSF leak    Hospital Course  See below    Interval Problem Update  Headache-gets better when lying down. MRI L spine pending. Pain is well controlled with IV morphine. Going for surgery tomorrow.     Consultants/Specialty  NSGY    Code Status  fcfc    Disposition  Neuro    Review of Systems  Review of Systems   Constitutional: Negative for chills and fever.   HENT: Negative for hearing loss.    Eyes: Negative for blurred vision.   Gastrointestinal: Positive for nausea.   Neurological: Positive for weakness and headaches (positional, worse when sitting upright). Negative for sensory change, speech change and focal weakness.   All other systems reviewed and are negative.       Physical Exam  Temp:  [36.1 °C (97 °F)-36.7 °C (98 °F)] 36.5 °C (97.7 °F)  Pulse:  [53-76] 53  Resp:  [15-20] 15  BP: (120-157)/(63-86) 120/63  SpO2:  [93 %-98 %] 94 %    Physical Exam   Constitutional: He is oriented to person, place, and time. He appears well-developed and well-nourished. No distress.   HENT:   Head: Normocephalic and atraumatic.   Mouth/Throat: No oropharyngeal exudate.   Eyes: Pupils are equal, round, and reactive to light. No scleral icterus.   Neck: Normal range of motion. Neck supple. No thyromegaly present.   Cardiovascular: Normal rate, regular rhythm, normal heart sounds and intact distal pulses.    No murmur heard.  Pulmonary/Chest: Effort normal and breath sounds normal. No respiratory distress.   Abdominal: Soft. Bowel sounds are normal. There is no tenderness.   Musculoskeletal: Normal range of motion. He exhibits no edema or tenderness.   Mild leaking in lumbar region   Neurological: He is alert and oriented to person, place, and time. No cranial nerve deficit.   Skin: Skin is warm and dry. No rash noted.   Psychiatric: He has a normal mood and affect.   Nursing note  and vitals reviewed.      Fluids    Intake/Output Summary (Last 24 hours) at 01/30/19 1618  Last data filed at 01/30/19 0606   Gross per 24 hour   Intake                0 ml   Output              350 ml   Net             -350 ml       Laboratory  Recent Labs      01/29/19   1620   WBC  8.0   RBC  4.81   HEMOGLOBIN  15.9   HEMATOCRIT  46.6   MCV  96.9   MCH  33.1*   MCHC  34.1   RDW  43.7   PLATELETCT  246   MPV  10.6     Recent Labs      01/29/19   1620   SODIUM  135   POTASSIUM  4.2   CHLORIDE  102   CO2  23   GLUCOSE  158*   BUN  16   CREATININE  0.80   CALCIUM  9.4                   Imaging  MR-LUMBAR SPINE-W/O   Final Result      1.  There is an approximately 7.9 x 1.9 cm sized posterior extradural postsurgical fluid collection at the levels of L2-3, L3-4 and L4-5. The differential diagnosis includes postsurgical seroma and pseudomeningocele.   2.  Severe effacement of the thecal sac at the levels of L3 and L3-4 secondary to the disc bulge and fluid collection.   3.  Postsurgical fluid collection in the subcutaneous soft tissue in the back.   4.  Degenerative disease as described above.           Assessment/Plan  * Postoperative CSF leak- (present on admission)   Assessment & Plan    Lumbar MRI confirms, 7X3 cm fluid collection  -lay down flat  -IV morphine PRN severe pain, oxy PRN moderate  -surgery tomorrow     Nausea & vomiting- (present on admission)   Assessment & Plan    PRN IV and Zofran     Slow transit constipation- (present on admission)   Assessment & Plan    Bowel protocol    Minimize narcotic     Osteoarthritis involving multiple joints on both sides of body- (present on admission)   Assessment & Plan    Prednisone po     Essential hypertension- (present on admission)   Assessment & Plan    Atenolol          VTE prophylaxis: SCD's

## 2019-02-01 LAB
ANION GAP SERPL CALC-SCNC: 8 MMOL/L (ref 0–11.9)
BUN SERPL-MCNC: 13 MG/DL (ref 8–22)
CALCIUM SERPL-MCNC: 8.1 MG/DL (ref 8.5–10.5)
CHLORIDE SERPL-SCNC: 101 MMOL/L (ref 96–112)
CO2 SERPL-SCNC: 27 MMOL/L (ref 20–33)
CREAT SERPL-MCNC: 0.74 MG/DL (ref 0.5–1.4)
GLUCOSE SERPL-MCNC: 171 MG/DL (ref 65–99)
POTASSIUM SERPL-SCNC: 4.5 MMOL/L (ref 3.6–5.5)
SODIUM SERPL-SCNC: 136 MMOL/L (ref 135–145)

## 2019-02-01 PROCEDURE — A9270 NON-COVERED ITEM OR SERVICE: HCPCS | Performed by: PHYSICIAN ASSISTANT

## 2019-02-01 PROCEDURE — A9270 NON-COVERED ITEM OR SERVICE: HCPCS | Performed by: HOSPITALIST

## 2019-02-01 PROCEDURE — 770006 HCHG ROOM/CARE - MED/SURG/GYN SEMI*

## 2019-02-01 PROCEDURE — 700102 HCHG RX REV CODE 250 W/ 637 OVERRIDE(OP): Performed by: PHYSICIAN ASSISTANT

## 2019-02-01 PROCEDURE — 700102 HCHG RX REV CODE 250 W/ 637 OVERRIDE(OP): Performed by: HOSPITALIST

## 2019-02-01 PROCEDURE — 700112 HCHG RX REV CODE 229: Performed by: PHYSICIAN ASSISTANT

## 2019-02-01 PROCEDURE — 80048 BASIC METABOLIC PNL TOTAL CA: CPT

## 2019-02-01 PROCEDURE — 700111 HCHG RX REV CODE 636 W/ 250 OVERRIDE (IP): Performed by: PHYSICIAN ASSISTANT

## 2019-02-01 PROCEDURE — 700111 HCHG RX REV CODE 636 W/ 250 OVERRIDE (IP): Performed by: HOSPITALIST

## 2019-02-01 PROCEDURE — 700101 HCHG RX REV CODE 250: Performed by: PHYSICIAN ASSISTANT

## 2019-02-01 PROCEDURE — 99232 SBSQ HOSP IP/OBS MODERATE 35: CPT | Performed by: INTERNAL MEDICINE

## 2019-02-01 PROCEDURE — 36415 COLL VENOUS BLD VENIPUNCTURE: CPT

## 2019-02-01 RX ORDER — METHOCARBAMOL 750 MG/1
750 TABLET, FILM COATED ORAL EVERY 6 HOURS
Status: DISCONTINUED | OUTPATIENT
Start: 2019-02-01 | End: 2019-02-04 | Stop reason: HOSPADM

## 2019-02-01 RX ORDER — KETOROLAC TROMETHAMINE 30 MG/ML
15 INJECTION, SOLUTION INTRAMUSCULAR; INTRAVENOUS EVERY 6 HOURS
Status: COMPLETED | OUTPATIENT
Start: 2019-02-01 | End: 2019-02-02

## 2019-02-01 RX ADMIN — ONDANSETRON 4 MG: 2 INJECTION INTRAMUSCULAR; INTRAVENOUS at 12:11

## 2019-02-01 RX ADMIN — PREDNISONE 5 MG: 5 TABLET ORAL at 05:19

## 2019-02-01 RX ADMIN — CEFAZOLIN SODIUM 1 G: 1 INJECTION, SOLUTION INTRAVENOUS at 13:51

## 2019-02-01 RX ADMIN — ANTACID TABLETS 500 MG: 500 TABLET, CHEWABLE ORAL at 05:18

## 2019-02-01 RX ADMIN — Medication 1 TABLET: at 20:27

## 2019-02-01 RX ADMIN — ANTACID TABLETS 500 MG: 500 TABLET, CHEWABLE ORAL at 17:01

## 2019-02-01 RX ADMIN — ENOXAPARIN SODIUM 40 MG: 100 INJECTION SUBCUTANEOUS at 18:32

## 2019-02-01 RX ADMIN — DOCUSATE SODIUM 100 MG: 100 CAPSULE, LIQUID FILLED ORAL at 17:02

## 2019-02-01 RX ADMIN — CEFAZOLIN SODIUM 1 G: 1 INJECTION, SOLUTION INTRAVENOUS at 05:19

## 2019-02-01 RX ADMIN — ONDANSETRON 4 MG: 2 INJECTION INTRAMUSCULAR; INTRAVENOUS at 17:02

## 2019-02-01 RX ADMIN — ACETAMINOPHEN 650 MG: 325 TABLET, FILM COATED ORAL at 05:18

## 2019-02-01 RX ADMIN — MORPHINE SULFATE 4 MG: 4 INJECTION INTRAVENOUS at 20:28

## 2019-02-01 RX ADMIN — MORPHINE SULFATE 2 MG: 4 INJECTION INTRAVENOUS at 10:46

## 2019-02-01 RX ADMIN — DOCUSATE SODIUM 100 MG: 100 CAPSULE, LIQUID FILLED ORAL at 05:18

## 2019-02-01 RX ADMIN — METHOCARBAMOL TABLETS 750 MG: 750 TABLET, COATED ORAL at 17:01

## 2019-02-01 RX ADMIN — Medication 500 MG: at 05:18

## 2019-02-01 RX ADMIN — ATENOLOL 25 MG: 25 TABLET ORAL at 05:18

## 2019-02-01 RX ADMIN — CEPHALEXIN 500 MG: 500 CAPSULE ORAL at 20:27

## 2019-02-01 RX ADMIN — VITAMIN D, TAB 1000IU (100/BT) 1000 UNITS: 25 TAB at 05:19

## 2019-02-01 RX ADMIN — KETOROLAC TROMETHAMINE 15 MG: 30 INJECTION, SOLUTION INTRAMUSCULAR; INTRAVENOUS at 17:02

## 2019-02-01 RX ADMIN — POTASSIUM CHLORIDE, DEXTROSE MONOHYDRATE AND SODIUM CHLORIDE: 150; 5; 450 INJECTION, SOLUTION INTRAVENOUS at 09:00

## 2019-02-01 RX ADMIN — POTASSIUM CHLORIDE, DEXTROSE MONOHYDRATE AND SODIUM CHLORIDE: 150; 5; 450 INJECTION, SOLUTION INTRAVENOUS at 20:32

## 2019-02-01 RX ADMIN — OXYCODONE HYDROCHLORIDE 5 MG: 5 TABLET ORAL at 07:10

## 2019-02-01 RX ADMIN — MORPHINE SULFATE 4 MG: 4 INJECTION INTRAVENOUS at 14:03

## 2019-02-01 ASSESSMENT — ENCOUNTER SYMPTOMS
DIZZINESS: 0
FOCAL WEAKNESS: 0
HEADACHES: 1
NAUSEA: 1
WEAKNESS: 1
SENSORY CHANGE: 1
CHILLS: 0
DIARRHEA: 0
BLURRED VISION: 0
ABDOMINAL PAIN: 0
VOMITING: 0

## 2019-02-01 NOTE — THERAPY
OT referral received. Pt has bedrest orders due to CSF leak. Will complete OT eval when cleared for OOB activity.

## 2019-02-01 NOTE — PROGRESS NOTES
Received report from NOC RN. Assumed care of patient at 0700. Patient sleeping comfortably in bed at this time. On 2L NC, no signs of respiratory distress. No s/sx of pain or discomfort. Call light and belongings within reach. Bed in lowest locked position. Upper side rails raised. Hourly rounding in place. Will continue to monitor.

## 2019-02-01 NOTE — OR NURSING
Patient awake and alert. Pt tolerating sips of water without issue. Pt laying flat and has been educated on time length of 48 hours and why he must lay flat. Pt states understanding. VSS. Pt dressing to back is CDI. Pt states pain is mild and no nausea reported. Will continue to monitor.

## 2019-02-01 NOTE — PROGRESS NOTES
Neurosurgery Progress Note    Subjective:  POD1 pseudomeningocoele repair.  Doing well, denies HA, c/o LBP  Persistent paresthesias to right plantar surface, otherwise no leg pain  No drain  Flat in bed    Exam:  Motor 5/5  No calf tenderness  Ioban/ABD pad dry    BP  Min: 141/69  Max: 171/77  Pulse  Av.7  Min: 55  Max: 85  Resp  Avg: 15.2  Min: 9  Max: 20  Temp  Av.4 °C (97.5 °F)  Min: 36.1 °C (97 °F)  Max: 36.7 °C (98 °F)  SpO2  Av.7 %  Min: 92 %  Max: 100 %    No Data Recorded    Recent Labs      19   1620  19   0344   WBC  8.0  7.0   RBC  4.81  4.62*   HEMOGLOBIN  15.9  14.9   HEMATOCRIT  46.6  45.1   MCV  96.9  97.6   MCH  33.1*  32.3   MCHC  34.1  33.0*   RDW  43.7  43.3   PLATELETCT  246  215   MPV  10.6  10.2     Recent Labs      19   1620  19   0344  19   0318   SODIUM  135  134*  136   POTASSIUM  4.2  3.8  4.5   CHLORIDE  102  103  101   CO2  23  26  27   GLUCOSE  158*  90  171*   BUN  16  15  13   CREATININE  0.80  0.83  0.74   CALCIUM  9.4  9.1  8.1*               Intake/Output       19 0700 - 19 0659 19 07 - 19 0659       Total 7124-7173 0221-7859 Total       Intake    P.O.  240  -- 240  --  -- --    P.O. 240 -- 240 -- -- --    I.V.  --  1200 1200  --  -- --    Crystalloid Intake -- 1200 1200 -- -- --    IV Piggyback  100  -- 100  --  -- --    Volume (mL) (ceFAZolin in dextrose (ANCEF) IVPB premix 1 g) 100 -- 100 -- -- --    Total Intake 340 1200 1540 -- -- --       Output    Urine  925  400 1325  --  -- --    Urine Void (mL)  -- -- --    Blood  --  25 25  --  -- --    Est. Blood Loss (mL) -- 25 25 -- -- --    Total Output  -- -- --       Net I/O     -585 775 190 -- -- --            Intake/Output Summary (Last 24 hours) at 19 0810  Last data filed at 19 2200   Gross per 24 hour   Intake             1540 ml   Output             1350 ml   Net              190 ml            •  ceFAZolin  1 g Q8HRS   • Pharmacy Consult Request  1 Each PHARMACY TO DOSE   • docusate sodium  100 mg BID   • senna-docusate  1 Tab Nightly   • senna-docusate  1 Tab Q24HRS PRN   • polyethylene glycol/lytes  1 Packet BID PRN   • magnesium hydroxide  30 mL QDAY PRN   • bisacodyl  10 mg Q24HRS PRN   • fleet  1 Each Once PRN   • MD ALERT...DO NOT ADMINISTER NSAIDS or ASPIRIN unless ORDERED By Neurosurgery  1 Each PRN   • acetaminophen  650 mg Q6HRS   • oxyCODONE immediate-release  5 mg Q3HRS PRN   • oxyCODONE immediate release  10 mg Q3HRS PRN   • morphine injection  2-4 mg Q3HRS PRN   • dextrose 5 % and 0.45 % NaCl with KCl 20 mEq   Continuous   • enoxaparin  40 mg DAILY   • diphenhydrAMINE  25 mg Q6HRS PRN    Or   • diphenhydrAMINE  25 mg Q6HRS PRN   • ondansetron  4 mg Q4HRS PRN   • ondansetron  4 mg Q4HRS PRN   • methocarbamol  750 mg 4X/DAY PRN   • ALPRAZolam  0.25 mg BID PRN   • benzocaine-menthol  1 Lozenge Q2HRS PRN   • calcium carbonate  500 mg BID   • hydrALAZINE  10 mg Q HOUR PRN   • cephALEXin  500 mg Q6HRS   • atenolol  25 mg DAILY   • predniSONE  5 mg DAILY   • vitamin D  1,000 Units DAILY   • acetaminophen  650 mg Q4HRS PRN   • calcium carbonate  500 mg QDAY with Breakfast       Assessment and Plan:  POD #1  Flat in bed, HOB 0-30, ok to lie on side  Needs IS and SCDs  Prophylactic anticoagulation: Yes, today

## 2019-02-01 NOTE — PROGRESS NOTES
Paged Deanna Black P.A.-C with neurosurgery regarding patient's persistent pain, nausea, shakiness, and anxiety. MD aware, new order received via telephone with read back for Toradol 15 mg IV q 6 hrs PRN pain for 3 doses, and Robaxin 750 mg PO q 6 hrs scheduled.

## 2019-02-01 NOTE — THERAPY
PT orders received and acknowledged. Hold PT eval at this time. Pt on bed rest for 48 hours following surgery. Will complete PT eval as able.

## 2019-02-01 NOTE — PROGRESS NOTES
Pt A&Ox4. C/o headache pain, controlled with PRN morphine. Bedrest and HOB <30 degrees maintained. Pt NPO since breakfast for surgery this evening. VSS.

## 2019-02-01 NOTE — OR SURGEON
Immediate Post OP Note    PreOp Diagnosis: CSF leak, pseudomeningocoele, s/p laminectomies    PostOp Diagnosis: Same    Procedure(s):  LUMBAR EXPLORATION - W/PSEUDOMENINGOCELE REPAIR - Wound Class: Clean    Surgeon(s):  Terrence Gabriel M.D.    Anesthesiologist/Type of Anesthesia:  Anesthesiologist: Viktor Hanson M.D./General    Surgical Staff:  Assistant: Deanna Black P.A.-C.  Circulator: Judi Betancourt R.N.  Scrub Person: Ximena Gaston    Specimens removed if any:  * No specimens in log *    Estimated Blood Loss: 25ccs    Findings: See op report    Complications: None        1/31/2019 7:33 PM Deanna Black P.A.-C.

## 2019-02-01 NOTE — CARE PLAN
Problem: Communication  Goal: The ability to communicate needs accurately and effectively will improve    Intervention: [place] patient and significant other/support system to call light to alert staff of needs  [place] patient to call light system, call light within reach, and patient instructed not to get out of bed. Patient to remain flat for 48hrs after work       Problem: Safety  Goal: Will remain free from falls    Intervention: Implement fall precautions  Fall precautions in place at this time, rn will continue to monitor, bed is locked and in the lowest position

## 2019-02-01 NOTE — OP REPORT
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    PROCEDURES:  1.  Redo left L4 laminotomy and foraminotomy, decompression of left L4 nerve   root.  2.  Redo left L5 laminotomy and foraminotomy, decompression of left L5 nerve   root.  3.  Complex repair of lateral durotomy from extremely thin friable dura.    SURGEON:  Terrence Gabriel MD, neurosurgery-spine surgery.    ASSISTANT:  Deanna Alford PA-C    ANESTHESIA:  General endotracheal.    ANESTHESIOLOGIST:  Viktor Hanson MD    COMPLICATIONS:  None.    ESTIMATED BLOOD LOSS:  Less than 10 mL.    PREOPERATIVE NOTE:  This is an extremely pleasant 74-year-old male who several   weeks ago underwent a successful L2-L5 lumbar laminectomies with bilateral   foraminotomies.  The patient did extremely well after surgery initially.  Pain   was controlled, he was not having any leg pain.  He subsequently developed   headaches and was kept flat for several days; this however, progressed over   the last few days to increasing headaches and some drainage from his wound.    Of interest, the patient certainly did not have a durotomy during surgery at   all as he was supine initially after surgery as well.  This is clearly a   delayed pseudomeningocele with durotomy that came on after surgery as the dura   expanded.  I discussed given that the scenario that the patient certainly   needs to return to surgery for revision of his lumbar wound and repair this   durotomy as indicated.  I discussed surgical procedure, alternatives, goals,   risks and benefits, complications in detail with the patient.  I used a bone   model, MRI and educational handouts to assist the patient with his decision   making, answered all questions to the best of my ability.  The patient   understood and consents for  surgery.    NARRATIVE DICTATION:  The patient was brought to the operating room and placed   under general endotracheal anesthesia.  He was placed prone on the operating   OSI table with care taken about the bony prominences, peripheral nerves.    Lumbar region was prepped and draped in usual sterile fashion.  Following   localization with cross table fluoroscopy, the prior incision was reopened.    The sutures were removed.  Self-retaining retraction applied.  It was clear   that there was simply a blowout of the left side of the dura in the lateral   margin, just some thinning of the dura.  This was certainly not seen   intraoperatively at all and there was no evidence of dural violation injury   index surgery.  Given the lateral location over the nerve root, a redo left L4   laminotomy and foraminotomies completed and redo left L5 laminotomy and   foraminotomies completed to get wide to get this complex repair.  I used   several 6-0 Parkman-Afshin sutures to perform the complex repair of the dura.  There   was one other small pinned area of the dura that was repaired with 6-0   Parkman-Afshin as well.  This seemed to give a watertight seal.  The wound was   irrigated with bacitracin irrigation.  I sutured in a muscle patch over the   repair for watertight seal.  Wound was irrigated, hemostasis achieved.  Dural   Duragen patch was placed over this repair and then DuraSeal was placed over   this for further sealing.  Immaculate hemostasis achieved of the muscle.  The   wound was then closed in multiple layers with 1-0 Vicryl to the deep fascia,   2-0 Vicryl to the dermal layer and running 3-0 nylon to skin.  Small sterile   dressing was applied.  Swabs, needles, instruments correct by 2 count.  No   complications were encountered.  The patient tolerated the procedure, was   stable and transferred to recovery room.  The patient will be observed at   [facility] for several days until he meets discharge    criteria.       ___________________________________MD TAYLOR PAREKH / SMILEY    DD:  01/31/2019 20:03:03  DT:  01/31/2019 22:05:24    D#:  [id number]  Job#:  [id number]

## 2019-02-01 NOTE — PROGRESS NOTES
[facility] Medicine Daily Progress Note    Date of Service  2/1/2019    Chief Complaint  74 y.o. male admitted 1/29/2019 with likely post operative CSF leak    Hospital Course  See below    Interval Problem Update  Headache-s/p dural repair and redo L4-L5 laminectomy POD#1, doing well, is on bed rest and does not have an HA unless he sits up partially. Denies any leakage around the surgical site. Continued numbness around the base of the R toes. Afebrile.     Consultants/Specialty  NSGY    Code Status  fcfc    Disposition  Neuro    Review of Systems  Review of Systems   Constitutional: Negative for chills.   HENT: Negative for tinnitus.    Eyes: Negative for blurred vision.   Gastrointestinal: Positive for nausea (minimal). Negative for abdominal pain, diarrhea and vomiting.   Genitourinary: Negative for dysuria and urgency.   Neurological: Positive for sensory change (on the bottom of R toes), weakness (remains mostly the same) and headaches (positional, improved today). Negative for dizziness and focal weakness.   All other systems reviewed and are negative.       Physical Exam  Temp:  [36.1 °C (97 °F)-36.7 °C (98 °F)] 36.5 °C (97.7 °F)  Pulse:  [55-85] 60  Resp:  [9-20] 16  BP: (123-171)/() 123/78  SpO2:  [92 %-100 %] 95 %    Physical Exam   Constitutional: He is oriented to person, place, and time. He appears well-developed and well-nourished. No distress.   Laying flat in bed, appears comfortable   HENT:   Head: Normocephalic and atraumatic.   Mouth/Throat: No oropharyngeal exudate.   Eyes: Pupils are equal, round, and reactive to light. No scleral icterus.   Neck: Normal range of motion. Neck supple.   Cardiovascular: Normal rate, regular rhythm, normal heart sounds and intact distal pulses.    Pulmonary/Chest: Effort normal and breath sounds normal. No stridor. No respiratory distress.   Abdominal: Soft. Bowel sounds are normal. He exhibits no distension.   Musculoskeletal: Normal range of motion. He  exhibits no tenderness.   Gauze in the lumbar region, appears C/D/I   Neurological: He is alert and oriented to person, place, and time. No cranial nerve deficit.   Skin: Skin is warm and dry. No erythema.   Psychiatric: He has a normal mood and affect.   Nursing note and vitals reviewed.      Fluids    Intake/Output Summary (Last 24 hours) at 02/01/19 1537  Last data filed at 02/01/19 1500   Gross per 24 hour   Intake             1200 ml   Output             1375 ml   Net             -175 ml       Laboratory  Recent Labs      01/29/19   1620  01/31/19   0344   WBC  8.0  7.0   RBC  4.81  4.62*   HEMOGLOBIN  15.9  14.9   HEMATOCRIT  46.6  45.1   MCV  96.9  97.6   MCH  33.1*  32.3   MCHC  34.1  33.0*   RDW  43.7  43.3   PLATELETCT  246  215   MPV  10.6  10.2     Recent Labs      01/29/19   1620  01/31/19   0344  02/01/19   0318   SODIUM  135  134*  136   POTASSIUM  4.2  3.8  4.5   CHLORIDE  102  103  101   CO2  23  26  27   GLUCOSE  158*  90  171*   BUN  16  15  13   CREATININE  0.80  0.83  0.74   CALCIUM  9.4  9.1  8.1*                   Imaging  MR-LUMBAR SPINE-W/O   Final Result      1.  There is an approximately 7.9 x 1.9 cm sized posterior extradural postsurgical fluid collection at the levels of L2-3, L3-4 and L4-5. The differential diagnosis includes postsurgical seroma and pseudomeningocele.   2.  Severe effacement of the thecal sac at the levels of L3 and L3-4 secondary to the disc bulge and fluid collection.   3.  Postsurgical fluid collection in the subcutaneous soft tissue in the back.   4.  Degenerative disease as described above.           Assessment/Plan  * Postoperative CSF leak- (present on admission)   Assessment & Plan    Lumbar MRI confirms, 7X3 cm fluid collection  -lay down flat  -IV morphine PRN severe pain, oxy PRN moderate-current regimen appears to be helping quite nicely  -s/p dural repair and redo L4-L5 laminectomy POD#1, doing well, bed rest for 24 hours  -multi modal pain therapy applied  as well     Nausea & vomiting- (present on admission)   Assessment & Plan    PRN IV and Zofran     Slow transit constipation- (present on admission)   Assessment & Plan    Bowel protocol-passing gas  -minimize narcotic use     Osteoarthritis involving multiple joints on both sides of body- (present on admission)   Assessment & Plan    Prednisone po     Essential hypertension- (present on admission)   Assessment & Plan    Atenolol          VTE prophylaxis: SCD's

## 2019-02-01 NOTE — CARE PLAN
Problem: Safety  Goal: Will remain free from falls    Intervention: Implement fall precautions  Patient on bed rest for 48 hours post op. Patient does not require assistance with mobilization at this time, however fall precautions are in place. Patient educated on use of call light, patient verbalizes understanding and calls appropriately. Bed in lowest locked position. Non-skid socks on. Upper bed rails raised. Room close to nurses station. Hourly rounding in place.       Problem: Knowledge Deficit  Goal: Knowledge of disease process/condition, treatment plan, diagnostic tests, and medications will improve    Intervention: Explain information regarding disease process/condition, treatment plan, diagnostic tests, and medications and document in education  Patient educated on POC, treatment plan, diagnostics tests, medications, diet, bed rest, pain management, and encouraged to ask questions regarding care. Patient verbalizes understanding, and is an active participant in care. Reoriented to call light for assistance. Hourly rounding in place.

## 2019-02-02 LAB
ANION GAP SERPL CALC-SCNC: 5 MMOL/L (ref 0–11.9)
BASOPHILS # BLD AUTO: 0.2 % (ref 0–1.8)
BASOPHILS # BLD: 0.02 K/UL (ref 0–0.12)
BUN SERPL-MCNC: 9 MG/DL (ref 8–22)
CALCIUM SERPL-MCNC: 8.5 MG/DL (ref 8.5–10.5)
CHLORIDE SERPL-SCNC: 101 MMOL/L (ref 96–112)
CO2 SERPL-SCNC: 27 MMOL/L (ref 20–33)
CREAT SERPL-MCNC: 0.72 MG/DL (ref 0.5–1.4)
EOSINOPHIL # BLD AUTO: 0.02 K/UL (ref 0–0.51)
EOSINOPHIL NFR BLD: 0.2 % (ref 0–6.9)
ERYTHROCYTE [DISTWIDTH] IN BLOOD BY AUTOMATED COUNT: 42.1 FL (ref 35.9–50)
GLUCOSE SERPL-MCNC: 128 MG/DL (ref 65–99)
HCT VFR BLD AUTO: 40.5 % (ref 42–52)
HGB BLD-MCNC: 13.4 G/DL (ref 14–18)
IMM GRANULOCYTES # BLD AUTO: 0.04 K/UL (ref 0–0.11)
IMM GRANULOCYTES NFR BLD AUTO: 0.3 % (ref 0–0.9)
LYMPHOCYTES # BLD AUTO: 1.12 K/UL (ref 1–4.8)
LYMPHOCYTES NFR BLD: 9.6 % (ref 22–41)
MCH RBC QN AUTO: 32.1 PG (ref 27–33)
MCHC RBC AUTO-ENTMCNC: 33.1 G/DL (ref 33.7–35.3)
MCV RBC AUTO: 97.1 FL (ref 81.4–97.8)
MONOCYTES # BLD AUTO: 1.75 K/UL (ref 0–0.85)
MONOCYTES NFR BLD AUTO: 15 % (ref 0–13.4)
NEUTROPHILS # BLD AUTO: 8.73 K/UL (ref 1.82–7.42)
NEUTROPHILS NFR BLD: 74.7 % (ref 44–72)
NRBC # BLD AUTO: 0 K/UL
NRBC BLD-RTO: 0 /100 WBC
PLATELET # BLD AUTO: 200 K/UL (ref 164–446)
PMV BLD AUTO: 10.3 FL (ref 9–12.9)
POTASSIUM SERPL-SCNC: 4 MMOL/L (ref 3.6–5.5)
RBC # BLD AUTO: 4.17 M/UL (ref 4.7–6.1)
SODIUM SERPL-SCNC: 133 MMOL/L (ref 135–145)
WBC # BLD AUTO: 11.7 K/UL (ref 4.8–10.8)

## 2019-02-02 PROCEDURE — 770006 HCHG ROOM/CARE - MED/SURG/GYN SEMI*

## 2019-02-02 PROCEDURE — A9270 NON-COVERED ITEM OR SERVICE: HCPCS | Performed by: HOSPITALIST

## 2019-02-02 PROCEDURE — 700111 HCHG RX REV CODE 636 W/ 250 OVERRIDE (IP): Performed by: HOSPITALIST

## 2019-02-02 PROCEDURE — A9270 NON-COVERED ITEM OR SERVICE: HCPCS | Performed by: PHYSICIAN ASSISTANT

## 2019-02-02 PROCEDURE — 99232 SBSQ HOSP IP/OBS MODERATE 35: CPT | Performed by: INTERNAL MEDICINE

## 2019-02-02 PROCEDURE — 36415 COLL VENOUS BLD VENIPUNCTURE: CPT

## 2019-02-02 PROCEDURE — 85025 COMPLETE CBC W/AUTO DIFF WBC: CPT

## 2019-02-02 PROCEDURE — 80048 BASIC METABOLIC PNL TOTAL CA: CPT

## 2019-02-02 PROCEDURE — 700112 HCHG RX REV CODE 229: Performed by: PHYSICIAN ASSISTANT

## 2019-02-02 PROCEDURE — 700102 HCHG RX REV CODE 250 W/ 637 OVERRIDE(OP): Performed by: HOSPITALIST

## 2019-02-02 PROCEDURE — 700111 HCHG RX REV CODE 636 W/ 250 OVERRIDE (IP): Performed by: PHYSICIAN ASSISTANT

## 2019-02-02 PROCEDURE — 700102 HCHG RX REV CODE 250 W/ 637 OVERRIDE(OP): Performed by: PHYSICIAN ASSISTANT

## 2019-02-02 RX ADMIN — OXYCODONE HYDROCHLORIDE 10 MG: 10 TABLET ORAL at 17:42

## 2019-02-02 RX ADMIN — KETOROLAC TROMETHAMINE 15 MG: 30 INJECTION, SOLUTION INTRAMUSCULAR; INTRAVENOUS at 00:01

## 2019-02-02 RX ADMIN — METHOCARBAMOL TABLETS 750 MG: 750 TABLET, COATED ORAL at 17:39

## 2019-02-02 RX ADMIN — CEPHALEXIN 500 MG: 500 CAPSULE ORAL at 11:01

## 2019-02-02 RX ADMIN — KETOROLAC TROMETHAMINE 15 MG: 30 INJECTION, SOLUTION INTRAMUSCULAR; INTRAVENOUS at 05:15

## 2019-02-02 RX ADMIN — ACETAMINOPHEN 650 MG: 325 TABLET, FILM COATED ORAL at 05:15

## 2019-02-02 RX ADMIN — ANTACID TABLETS 500 MG: 500 TABLET, CHEWABLE ORAL at 17:41

## 2019-02-02 RX ADMIN — DOCUSATE SODIUM 100 MG: 100 CAPSULE, LIQUID FILLED ORAL at 05:15

## 2019-02-02 RX ADMIN — METHOCARBAMOL TABLETS 750 MG: 750 TABLET, COATED ORAL at 05:15

## 2019-02-02 RX ADMIN — ACETAMINOPHEN 650 MG: 325 TABLET, FILM COATED ORAL at 17:41

## 2019-02-02 RX ADMIN — ATENOLOL 25 MG: 25 TABLET ORAL at 05:15

## 2019-02-02 RX ADMIN — MORPHINE SULFATE 4 MG: 4 INJECTION INTRAVENOUS at 18:10

## 2019-02-02 RX ADMIN — CEPHALEXIN 500 MG: 500 CAPSULE ORAL at 17:42

## 2019-02-02 RX ADMIN — DOCUSATE SODIUM 100 MG: 100 CAPSULE, LIQUID FILLED ORAL at 17:40

## 2019-02-02 RX ADMIN — ANTACID TABLETS 500 MG: 500 TABLET, CHEWABLE ORAL at 05:15

## 2019-02-02 RX ADMIN — OXYCODONE HYDROCHLORIDE 5 MG: 5 TABLET ORAL at 16:17

## 2019-02-02 RX ADMIN — PREDNISONE 5 MG: 5 TABLET ORAL at 05:15

## 2019-02-02 RX ADMIN — POLYETHYLENE GLYCOL 3350 1 PACKET: 17 POWDER, FOR SOLUTION ORAL at 07:58

## 2019-02-02 RX ADMIN — Medication 1 TABLET: at 21:50

## 2019-02-02 RX ADMIN — POLYETHYLENE GLYCOL 3350 1 PACKET: 17 POWDER, FOR SOLUTION ORAL at 17:42

## 2019-02-02 RX ADMIN — MORPHINE SULFATE 4 MG: 4 INJECTION INTRAVENOUS at 07:58

## 2019-02-02 RX ADMIN — ENOXAPARIN SODIUM 40 MG: 100 INJECTION SUBCUTANEOUS at 05:15

## 2019-02-02 RX ADMIN — OXYCODONE HYDROCHLORIDE 5 MG: 5 TABLET ORAL at 07:58

## 2019-02-02 RX ADMIN — VITAMIN D, TAB 1000IU (100/BT) 1000 UNITS: 25 TAB at 05:15

## 2019-02-02 RX ADMIN — Medication 500 MG: at 11:00

## 2019-02-02 RX ADMIN — CEPHALEXIN 500 MG: 500 CAPSULE ORAL at 00:00

## 2019-02-02 RX ADMIN — ACETAMINOPHEN 650 MG: 325 TABLET, FILM COATED ORAL at 00:00

## 2019-02-02 RX ADMIN — ACETAMINOPHEN 650 MG: 325 TABLET, FILM COATED ORAL at 11:01

## 2019-02-02 RX ADMIN — OXYCODONE HYDROCHLORIDE 10 MG: 10 TABLET ORAL at 11:01

## 2019-02-02 RX ADMIN — CEPHALEXIN 500 MG: 500 CAPSULE ORAL at 05:15

## 2019-02-02 RX ADMIN — METHOCARBAMOL TABLETS 750 MG: 750 TABLET, COATED ORAL at 11:00

## 2019-02-02 RX ADMIN — METHOCARBAMOL TABLETS 750 MG: 750 TABLET, COATED ORAL at 00:00

## 2019-02-02 ASSESSMENT — ENCOUNTER SYMPTOMS
VOMITING: 0
FEVER: 0
SENSORY CHANGE: 1
DEPRESSION: 0
HEADACHES: 0
BLURRED VISION: 0
FOCAL WEAKNESS: 0
NAUSEA: 0
DIZZINESS: 0
WEAKNESS: 1

## 2019-02-02 NOTE — CARE PLAN
Problem: Communication  Goal: The ability to communicate needs accurately and effectively will improve    Intervention: [place] patient and significant other/support system to call light to alert staff of needs  Pt not understanding concept of PRN meds, frustrated bc pain is not controlled properly. Educated he needs to discuss how he feels w nurse to ensure being medicated appropriately.

## 2019-02-02 NOTE — PROGRESS NOTES
Neurosurgery Progress Note    Subjective:  POD#2 pseudomeningocoele repair.  C/O back pain.  No lower extremity radicular pain    Exam:  Motor 5/5  No calf tenderness  Ioban/ABD pad dry    BP  Min: 111/88  Max: 123/78  Pulse  Av.2  Min: 60  Max: 66  Resp  Av.6  Min: 16  Max: 19  Temp  Av.6 °C (97.9 °F)  Min: 36.3 °C (97.3 °F)  Max: 37.1 °C (98.7 °F)  SpO2  Av.5 %  Min: 93 %  Max: 98 %    No Data Recorded    Recent Labs      19   0344  19   0415   WBC  7.0  11.7*   RBC  4.62*  4.17*   HEMOGLOBIN  14.9  13.4*   HEMATOCRIT  45.1  40.5*   MCV  97.6  97.1   MCH  32.3  32.1   MCHC  33.0*  33.1*   RDW  43.3  42.1   PLATELETCT  215  200   MPV  10.2  10.3     Recent Labs      198  194   SODIUM  134*  136  133*   POTASSIUM  3.8  4.5  4.0   CHLORIDE  103  101  101   CO2  26  27  27   GLUCOSE  90  171*  128*   BUN  15  13  9   CREATININE  0.83  0.74  0.72   CALCIUM  9.1  8.1*  8.5               Intake/Output       19 - 19 0659 19 - 1959      1900-0659 Total 1900-0659 Total       Intake    Total Intake -- -- -- -- -- --       Output    Urine  900  550 1450  --  -- --    Number of Times Voided 3 x -- 3 x -- -- --    Urine Void (mL)  -- -- --    Total Output  -- -- --       Net I/O     -900 -550 -1450 -- -- --            Intake/Output Summary (Last 24 hours) at 19 1022  Last data filed at 19 0518   Gross per 24 hour   Intake                0 ml   Output             1450 ml   Net            -1450 ml            • methocarbamol  750 mg Q6HRS   • Pharmacy Consult Request  1 Each PHARMACY TO DOSE   • docusate sodium  100 mg BID   • senna-docusate  1 Tab Nightly   • senna-docusate  1 Tab Q24HRS PRN   • polyethylene glycol/lytes  1 Packet BID PRN   • magnesium hydroxide  30 mL QDAY PRN   • bisacodyl  10 mg Q24HRS PRN   • fleet  1 Each Once PRN   • MD ALERT...DO NOT  ADMINISTER NSAIDS or ASPIRIN unless ORDERED By Neurosurgery  1 Each PRN   • acetaminophen  650 mg Q6HRS   • oxyCODONE immediate-release  5 mg Q3HRS PRN   • oxyCODONE immediate release  10 mg Q3HRS PRN   • morphine injection  2-4 mg Q3HRS PRN   • dextrose 5 % and 0.45 % NaCl with KCl 20 mEq   Continuous   • enoxaparin  40 mg DAILY   • diphenhydrAMINE  25 mg Q6HRS PRN    Or   • diphenhydrAMINE  25 mg Q6HRS PRN   • ondansetron  4 mg Q4HRS PRN   • ondansetron  4 mg Q4HRS PRN   • ALPRAZolam  0.25 mg BID PRN   • benzocaine-menthol  1 Lozenge Q2HRS PRN   • calcium carbonate  500 mg BID   • hydrALAZINE  10 mg Q HOUR PRN   • cephALEXin  500 mg Q6HRS   • atenolol  25 mg DAILY   • predniSONE  5 mg DAILY   • vitamin D  1,000 Units DAILY   • acetaminophen  650 mg Q4HRS PRN   • calcium carbonate  500 mg QDAY with Breakfast       Assessment and Plan:  POD #2  Evaluated by Dr. Gabriel  OK for HOB up as tolerated today.  If tolerating that, OK for OOB tomorrow. If that is tolerated, plan home Monday

## 2019-02-02 NOTE — CARE PLAN
Problem: Bowel/Gastric:  Goal: Will not experience complications related to bowel motility  Outcome: PROGRESSING AS EXPECTED      Problem: Pain Management  Goal: Pain level will decrease to patient's comfort goal  Outcome: PROGRESSING AS EXPECTED  Frequently assessed for pain and medicated per PRN

## 2019-02-02 NOTE — PROGRESS NOTES
[facility] Medicine Daily Progress Note    Date of Service  2/2/2019    Chief Complaint  74 y.o. male admitted 1/29/2019 with likely post operative CSF leak    Hospital Course  See below    Interval Problem Update  Headache-s/p dural repair and redo L4-L5 laminectomy POD#2, continues to improve. No HA, no back pain and afebrile. Minimal use of IV morphine at his time. Persistent numbness at the base of the R foot but otherwise no new neurological issues.    Consultants/Specialty  NSGY    Code Status  fcfc    Disposition  Neuro    Review of Systems  Review of Systems   Constitutional: Negative for fever.   HENT: Negative for hearing loss.    Eyes: Negative for blurred vision.   Cardiovascular: Negative for chest pain.   Gastrointestinal: Negative for nausea (minimal) and vomiting.   Genitourinary: Negative for dysuria.   Skin: Negative for rash.   Neurological: Positive for sensory change (on the bottom of R toes, unchanged today) and weakness (near his baseline). Negative for dizziness, focal weakness and headaches (resolved).   Psychiatric/Behavioral: Negative for depression.   All other systems reviewed and are negative.       Physical Exam  Temp:  [36.3 °C (97.3 °F)-37.1 °C (98.7 °F)] 36.3 °C (97.3 °F)  Pulse:  [60-66] 64  Resp:  [16-19] 19  BP: (111-123)/(52-88) 111/88  SpO2:  [93 %-98 %] 94 %    Physical Exam   Constitutional: He is oriented to person, place, and time. He appears well-developed and well-nourished.   Laying flat in bed, appears comfortable-unchanged today   HENT:   Head: Normocephalic and atraumatic.   Mouth/Throat: No oropharyngeal exudate.   Eyes: Pupils are equal, round, and reactive to light. Right eye exhibits no discharge. Left eye exhibits no discharge.   Neck: Normal range of motion. Neck supple.   Cardiovascular: Normal rate, regular rhythm, normal heart sounds and intact distal pulses.    Pulmonary/Chest: Effort normal and breath sounds normal. No stridor. No respiratory distress.    Abdominal: Soft. Bowel sounds are normal. There is no tenderness. There is no rebound.   Musculoskeletal: Normal range of motion. He exhibits no edema.   Thick Gauze in the lumbar region, appears C/D/I-no leakage appreciated today   Neurological: He is alert and oriented to person, place, and time. Coordination normal.   Skin: Skin is warm and dry. No erythema.   Psychiatric: He has a normal mood and affect.   Nursing note and vitals reviewed.      Fluids    Intake/Output Summary (Last 24 hours) at 02/02/19 1103  Last data filed at 02/02/19 0518   Gross per 24 hour   Intake                0 ml   Output             1150 ml   Net            -1150 ml       Laboratory  Recent Labs      01/31/19   0344  02/02/19   0415   WBC  7.0  11.7*   RBC  4.62*  4.17*   HEMOGLOBIN  14.9  13.4*   HEMATOCRIT  45.1  40.5*   MCV  97.6  97.1   MCH  32.3  32.1   MCHC  33.0*  33.1*   RDW  43.3  42.1   PLATELETCT  215  200   MPV  10.2  10.3     Recent Labs      01/31/19   0344  02/01/19   0318  02/02/19   0414   SODIUM  134*  136  133*   POTASSIUM  3.8  4.5  4.0   CHLORIDE  103  101  101   CO2  26  27  27   GLUCOSE  90  171*  128*   BUN  15  13  9   CREATININE  0.83  0.74  0.72   CALCIUM  9.1  8.1*  8.5                   Imaging  MR-LUMBAR SPINE-W/O   Final Result      1.  There is an approximately 7.9 x 1.9 cm sized posterior extradural postsurgical fluid collection at the levels of L2-3, L3-4 and L4-5. The differential diagnosis includes postsurgical seroma and pseudomeningocele.   2.  Severe effacement of the thecal sac at the levels of L3 and L3-4 secondary to the disc bulge and fluid collection.   3.  Postsurgical fluid collection in the subcutaneous soft tissue in the back.   4.  Degenerative disease as described above.           Assessment/Plan  * Postoperative CSF leak- (present on admission)   Assessment & Plan    Lumbar MRI confirms, 7X3 cm fluid collection  -lay down flat  -IV morphine PRN severe pain, oxy PRN moderate-current  regimen appears to be helping quite nicely  -s/p dural repair and redo L4-L5 laminectomy POD#2, doing well, bed rest finishes this PM, then OOB and observe stability, surgical site appears fine  -multi modal pain therapy applied as well     Nausea & vomiting- (present on admission)   Assessment & Plan    PRN IV and Zofran, well controlled at this time     Slow transit constipation- (present on admission)   Assessment & Plan    Bowel protocol-passing gas  -minimize narcotic use     Osteoarthritis involving multiple joints on both sides of body- (present on admission)   Assessment & Plan    Prednisone po     Essential hypertension- (present on admission)   Assessment & Plan    Atenolol          VTE prophylaxis: SCD's

## 2019-02-03 PROCEDURE — 97165 OT EVAL LOW COMPLEX 30 MIN: CPT

## 2019-02-03 PROCEDURE — 700102 HCHG RX REV CODE 250 W/ 637 OVERRIDE(OP): Performed by: PHYSICIAN ASSISTANT

## 2019-02-03 PROCEDURE — 97162 PT EVAL MOD COMPLEX 30 MIN: CPT

## 2019-02-03 PROCEDURE — 99232 SBSQ HOSP IP/OBS MODERATE 35: CPT | Performed by: INTERNAL MEDICINE

## 2019-02-03 PROCEDURE — A9270 NON-COVERED ITEM OR SERVICE: HCPCS | Performed by: PHYSICIAN ASSISTANT

## 2019-02-03 PROCEDURE — 700111 HCHG RX REV CODE 636 W/ 250 OVERRIDE (IP): Performed by: PHYSICIAN ASSISTANT

## 2019-02-03 PROCEDURE — 700102 HCHG RX REV CODE 250 W/ 637 OVERRIDE(OP): Performed by: HOSPITALIST

## 2019-02-03 PROCEDURE — 770006 HCHG ROOM/CARE - MED/SURG/GYN SEMI*

## 2019-02-03 PROCEDURE — 700111 HCHG RX REV CODE 636 W/ 250 OVERRIDE (IP): Performed by: HOSPITALIST

## 2019-02-03 PROCEDURE — 700112 HCHG RX REV CODE 229: Performed by: PHYSICIAN ASSISTANT

## 2019-02-03 PROCEDURE — A9270 NON-COVERED ITEM OR SERVICE: HCPCS | Performed by: HOSPITALIST

## 2019-02-03 RX ADMIN — DOCUSATE SODIUM 100 MG: 100 CAPSULE, LIQUID FILLED ORAL at 16:32

## 2019-02-03 RX ADMIN — MORPHINE SULFATE 4 MG: 4 INJECTION INTRAVENOUS at 05:41

## 2019-02-03 RX ADMIN — MAGNESIUM HYDROXIDE 30 ML: 400 SUSPENSION ORAL at 16:32

## 2019-02-03 RX ADMIN — PREDNISONE 5 MG: 5 TABLET ORAL at 05:30

## 2019-02-03 RX ADMIN — CEPHALEXIN 500 MG: 500 CAPSULE ORAL at 16:31

## 2019-02-03 RX ADMIN — CEPHALEXIN 500 MG: 500 CAPSULE ORAL at 10:59

## 2019-02-03 RX ADMIN — METHOCARBAMOL TABLETS 750 MG: 750 TABLET, COATED ORAL at 05:30

## 2019-02-03 RX ADMIN — ACETAMINOPHEN 650 MG: 325 TABLET, FILM COATED ORAL at 00:20

## 2019-02-03 RX ADMIN — CEPHALEXIN 500 MG: 500 CAPSULE ORAL at 23:17

## 2019-02-03 RX ADMIN — CEPHALEXIN 500 MG: 500 CAPSULE ORAL at 00:20

## 2019-02-03 RX ADMIN — ACETAMINOPHEN 650 MG: 325 TABLET, FILM COATED ORAL at 16:31

## 2019-02-03 RX ADMIN — DOCUSATE SODIUM 100 MG: 100 CAPSULE, LIQUID FILLED ORAL at 05:30

## 2019-02-03 RX ADMIN — ACETAMINOPHEN 650 MG: 325 TABLET, FILM COATED ORAL at 10:59

## 2019-02-03 RX ADMIN — METHOCARBAMOL TABLETS 750 MG: 750 TABLET, COATED ORAL at 11:00

## 2019-02-03 RX ADMIN — CEPHALEXIN 500 MG: 500 CAPSULE ORAL at 05:30

## 2019-02-03 RX ADMIN — METHOCARBAMOL TABLETS 750 MG: 750 TABLET, COATED ORAL at 00:20

## 2019-02-03 RX ADMIN — OXYCODONE HYDROCHLORIDE 10 MG: 10 TABLET ORAL at 10:59

## 2019-02-03 RX ADMIN — ENOXAPARIN SODIUM 40 MG: 100 INJECTION SUBCUTANEOUS at 05:40

## 2019-02-03 RX ADMIN — ACETAMINOPHEN 650 MG: 325 TABLET, FILM COATED ORAL at 23:17

## 2019-02-03 RX ADMIN — ANTACID TABLETS 500 MG: 500 TABLET, CHEWABLE ORAL at 05:30

## 2019-02-03 RX ADMIN — METHOCARBAMOL TABLETS 750 MG: 750 TABLET, COATED ORAL at 16:31

## 2019-02-03 RX ADMIN — ATENOLOL 25 MG: 25 TABLET ORAL at 05:30

## 2019-02-03 RX ADMIN — ACETAMINOPHEN 650 MG: 325 TABLET, FILM COATED ORAL at 05:30

## 2019-02-03 RX ADMIN — METHOCARBAMOL TABLETS 750 MG: 750 TABLET, COATED ORAL at 23:16

## 2019-02-03 RX ADMIN — VITAMIN D, TAB 1000IU (100/BT) 1000 UNITS: 25 TAB at 05:29

## 2019-02-03 RX ADMIN — POLYETHYLENE GLYCOL 3350 1 PACKET: 17 POWDER, FOR SOLUTION ORAL at 14:01

## 2019-02-03 ASSESSMENT — COGNITIVE AND FUNCTIONAL STATUS - GENERAL
TOILETING: A LITTLE
DRESSING REGULAR LOWER BODY CLOTHING: A LITTLE
TURNING FROM BACK TO SIDE WHILE IN FLAT BAD: UNABLE
CLIMB 3 TO 5 STEPS WITH RAILING: A LITTLE
STANDING UP FROM CHAIR USING ARMS: A LITTLE
MOVING TO AND FROM BED TO CHAIR: UNABLE
MOBILITY SCORE: 14
HELP NEEDED FOR BATHING: A LITTLE
SUGGESTED CMS G CODE MODIFIER MOBILITY: CL
WALKING IN HOSPITAL ROOM: A LITTLE
DAILY ACTIVITIY SCORE: 21
MOVING FROM LYING ON BACK TO SITTING ON SIDE OF FLAT BED: A LITTLE
SUGGESTED CMS G CODE MODIFIER DAILY ACTIVITY: CJ

## 2019-02-03 ASSESSMENT — GAIT ASSESSMENTS
ASSISTIVE DEVICE: FRONT WHEEL WALKER
GAIT LEVEL OF ASSIST: SUPERVISED
DISTANCE (FEET): 500

## 2019-02-03 ASSESSMENT — ENCOUNTER SYMPTOMS
DIZZINESS: 0
BLURRED VISION: 0
PALPITATIONS: 0
FEVER: 0
SENSORY CHANGE: 1
CHILLS: 0
FOCAL WEAKNESS: 0
WEAKNESS: 1
ABDOMINAL PAIN: 0

## 2019-02-03 ASSESSMENT — ACTIVITIES OF DAILY LIVING (ADL): TOILETING: INDEPENDENT

## 2019-02-03 NOTE — CARE PLAN
Problem: Bowel/Gastric:  Goal: Will not experience complications related to bowel motility  Outcome: PROGRESSING AS EXPECTED      Problem: Pain Management  Goal: Pain level will decrease to patient's comfort goal  Outcome: PROGRESSING AS EXPECTED

## 2019-02-03 NOTE — PROGRESS NOTES
Pt AO x4, on 2L O2, reports pain with movement. On regular diet, has not had a BM since last week, bowel protocol started. Bed rest completed yesterday, will clarify, HOB elevated and tolerating well, PT/OT to see later this morning. Refused morning calcium medication.

## 2019-02-03 NOTE — CARE PLAN
Problem: Bowel/Gastric:  Goal: Will not experience complications related to bowel motility    Intervention: Implement interventions to promote bowel evacuation if inadequate bowel movements in past 48 hours  implementing bowel protocol now that pt is not longer on bed rest restrictions.      Problem: Pain Management  Goal: Pain level will decrease to patient's comfort goal    Intervention: Follow pain managment plan developed in collaboration with patient and Interdisciplinary Team  Pain controlled per MAR.

## 2019-02-03 NOTE — THERAPY
"Occupational Therapy Evaluation completed.   Functional Status:  SPV for toileting; SPV for grooming while standing; CGA for sit>supine.   Plan of Care: Will benefit from Occupational Therapy 4 times per week  Discharge Recommendations:  Equipment: Will Continue to Assess for Equipment Needs. Recommend home health or outpatient transitional care services for continued occupational therapy services    See \"Rehab Therapy-Acute\" Patient Summary Report for complete documentation.    OT eval completed on 73 YO M recently d/c'ed after L4-5 lami redo and readmitted with postoperative CSF leak. Pt primarily limited due to pain and poor standing tolerance. Pt received while up in bathroom with RN. Pt with limited standing functional tasks due to report of high pain. Will provide pt with education on LB dressing and home mods in later tx sessions. Will continue to follow for acute OT services while in-house.   "

## 2019-02-03 NOTE — PROGRESS NOTES
Neurosurgery Progress Note    Subjective:  POD#3 pseudomeningocoele repair.  C/O back pain, improving  No HA  HOB was elevated this AM, tolerating fine thus far  No lower extremity radicular pain, paresthesias to right plantar surface    Exam:  Motor 5/5  No calf tenderness  Ioban/ABD pad dry    BP  Min: 101/60  Max: 137/65  Pulse  Av.8  Min: 59  Max: 72  Resp  Av.7  Min: 16  Max: 19  Temp  Av.2 °C (97.2 °F)  Min: 36 °C (96.8 °F)  Max: 36.6 °C (97.8 °F)  SpO2  Av.3 %  Min: 95 %  Max: 98 %    No Data Recorded    Recent Labs      19   0415   WBC  11.7*   RBC  4.17*   HEMOGLOBIN  13.4*   HEMATOCRIT  40.5*   MCV  97.1   MCH  32.1   MCHC  33.1*   RDW  42.1   PLATELETCT  200   MPV  10.3     Recent Labs      19   0318  19   0414   SODIUM  136  133*   POTASSIUM  4.5  4.0   CHLORIDE  101  101   CO2  27  27   GLUCOSE  171*  128*   BUN  13  9   CREATININE  0.74  0.72   CALCIUM  8.1*  8.5               Intake/Output       19 07 - 19 0659 19 - 19 0659       7466-2978 Total 4383-3929 5529-8832 Total       Intake    Total Intake -- -- -- -- -- --       Output    Urine  900  300 1200  --  -- --    Number of Times Voided -- 1 x 1 x -- -- --    Urine Void (mL)  -- -- --    Total Output  -- -- --       Net I/O     -900 -300 -1200 -- -- --            Intake/Output Summary (Last 24 hours) at 19 0839  Last data filed at 19   Gross per 24 hour   Intake                0 ml   Output             1200 ml   Net            -1200 ml            • methocarbamol  750 mg Q6HRS   • Pharmacy Consult Request  1 Each PHARMACY TO DOSE   • docusate sodium  100 mg BID   • senna-docusate  1 Tab Nightly   • senna-docusate  1 Tab Q24HRS PRN   • polyethylene glycol/lytes  1 Packet BID PRN   • magnesium hydroxide  30 mL QDAY PRN   • bisacodyl  10 mg Q24HRS PRN   • fleet  1 Each Once PRN   • MD ALERT...DO NOT ADMINISTER NSAIDS or ASPIRIN unless  ORDERED By Neurosurgery  1 Each PRN   • acetaminophen  650 mg Q6HRS   • oxyCODONE immediate-release  5 mg Q3HRS PRN   • oxyCODONE immediate release  10 mg Q3HRS PRN   • morphine injection  2-4 mg Q3HRS PRN   • dextrose 5 % and 0.45 % NaCl with KCl 20 mEq   Continuous   • enoxaparin  40 mg DAILY   • diphenhydrAMINE  25 mg Q6HRS PRN    Or   • diphenhydrAMINE  25 mg Q6HRS PRN   • ondansetron  4 mg Q4HRS PRN   • ondansetron  4 mg Q4HRS PRN   • ALPRAZolam  0.25 mg BID PRN   • benzocaine-menthol  1 Lozenge Q2HRS PRN   • calcium carbonate  500 mg BID   • hydrALAZINE  10 mg Q HOUR PRN   • cephALEXin  500 mg Q6HRS   • atenolol  25 mg DAILY   • predniSONE  5 mg DAILY   • vitamin D  1,000 Units DAILY   • acetaminophen  650 mg Q4HRS PRN   • calcium carbonate  500 mg QDAY with Breakfast       Assessment and Plan:  POD #3  Mobilize OOB to chair and ambulate today as tolerated  Transfer to neurosurgery, spoke with charge RN  Home tomorrow if tolerating OOB  Bowel regimen

## 2019-02-03 NOTE — THERAPY
"Physical Therapy Evaluation completed.   Bed Mobility:  Supine to Sit: Minimal Assist (HOB flat, with rail, verbal cueing for log roll)  Transfers: Sit to Stand: Minimal Assist  Gait: Level Of Assist: Supervised with Front-Wheel Walker       Plan of Care: Will benefit from Physical Therapy 3 times per week  Discharge Recommendations: Equipment: No Equipment Needed. Post-acute therapy: Recommend outpatient or home health transitional care services for continued physical therapy services.    See \"Rehab Therapy-Acute\" Patient Summary Report for complete documentation.      Patient is a 75 YO male admitted 1/29 with complaints of decreased appetite, malaise, nausea, vomiting, and headache. Patient s/p redo L4-5 laminectomy and dural repair with Dr. Gabriel on 1/31. Patient presented to PT with pain, impaired balance, and decreased activity tolerance. Patient ambulated approximately 500ft in unit with FWW and supervision. Patient reported paresthesias in both feet, reported it felt like he was walking on \"narrow boards under feet\". No other signs or symptoms during session. Reviewed spinal precautions and log roll with patient. Patient will benefit from continued acute PT services during hospital stay to progress functional mobility and independence. Recommend outpatient PT following medical clearance.  "

## 2019-02-03 NOTE — PROGRESS NOTES
[facility] Medicine Daily Progress Note    Date of Service  2/3/2019    Chief Complaint  74 y.o. male admitted 1/29/2019 with likely post operative CSF leak    Hospital Course  See below    Interval Problem Update  Headache-s/p dural repair and redo L4-L5 laminectomy POD#3, improving further, but surgical incision on back hurts depending on how he moves. No other new issues. Able to move somewhat today.     Consultants/Specialty  NSGY    Code Status  fcfc    Disposition  Neuro    Review of Systems  Review of Systems   Constitutional: Negative for chills and fever.   HENT: Negative for tinnitus.    Eyes: Negative for blurred vision.   Cardiovascular: Negative for palpitations.   Gastrointestinal: Negative for abdominal pain. Nausea: minimal.   Genitourinary: Negative for urgency.   Skin: Negative for itching.   Neurological: Positive for sensory change (on the bottom of R toes, no changes noted otday) and weakness (marginal improvement). Negative for dizziness and focal weakness. Headaches: resolved.   Psychiatric/Behavioral: Negative for suicidal ideas.   All other systems reviewed and are negative.       Physical Exam  Temp:  [36 °C (96.8 °F)-36.6 °C (97.8 °F)] 36 °C (96.8 °F)  Pulse:  [60-72] 63  Resp:  [16-19] 16  BP: (101-137)/(54-76) 101/60  SpO2:  [95 %-98 %] 97 %    Physical Exam   Constitutional: He is oriented to person, place, and time. He appears well-developed and well-nourished.   Appears even more comfortable today, sitting upright in bed   HENT:   Head: Normocephalic and atraumatic.   Eyes: Pupils are equal, round, and reactive to light. No scleral icterus.   Neck: Normal range of motion. Neck supple.   Cardiovascular: Normal rate, regular rhythm, normal heart sounds and intact distal pulses.    Pulmonary/Chest: Effort normal and breath sounds normal. He has no wheezes. He has no rales.   Abdominal: Soft. Bowel sounds are normal. He exhibits no distension. There is no tenderness.   Musculoskeletal:  Normal range of motion. He exhibits no edema.   Thick Gauze in the lumbar region, appears C/D/I-without interval change today   Neurological: He is alert and oriented to person, place, and time. No cranial nerve deficit.   Skin: Skin is warm and dry. No erythema.   Psychiatric: He has a normal mood and affect.   Nursing note and vitals reviewed.      Fluids    Intake/Output Summary (Last 24 hours) at 02/03/19 1221  Last data filed at 02/02/19 2000   Gross per 24 hour   Intake                0 ml   Output              925 ml   Net             -925 ml       Laboratory  Recent Labs      02/02/19   0415   WBC  11.7*   RBC  4.17*   HEMOGLOBIN  13.4*   HEMATOCRIT  40.5*   MCV  97.1   MCH  32.1   MCHC  33.1*   RDW  42.1   PLATELETCT  200   MPV  10.3     Recent Labs      02/01/19   0318  02/02/19   0414   SODIUM  136  133*   POTASSIUM  4.5  4.0   CHLORIDE  101  101   CO2  27  27   GLUCOSE  171*  128*   BUN  13  9   CREATININE  0.74  0.72   CALCIUM  8.1*  8.5                   Imaging  MR-LUMBAR SPINE-W/O   Final Result      1.  There is an approximately 7.9 x 1.9 cm sized posterior extradural postsurgical fluid collection at the levels of L2-3, L3-4 and L4-5. The differential diagnosis includes postsurgical seroma and pseudomeningocele.   2.  Severe effacement of the thecal sac at the levels of L3 and L3-4 secondary to the disc bulge and fluid collection.   3.  Postsurgical fluid collection in the subcutaneous soft tissue in the back.   4.  Degenerative disease as described above.           Assessment/Plan  * Postoperative CSF leak- (present on admission)   Assessment & Plan    Lumbar MRI confirms, 7X3 cm fluid collection  -lay down flat  -IV morphine PRN severe pain, oxy PRN moderate-current regimen appears to be helping quite nicely  -s/p dural repair and redo L4-L5 laminectomy POD#3, improving slowly, liberate physical movement today, PT/OT to see patient today, might need SNF versus HH  -multi modal pain therapy applied  as well  -oral keflex per NSGY     Nausea & vomiting- (present on admission)   Assessment & Plan    PRN IV and Zofran, well controlled at this time     Slow transit constipation- (present on admission)   Assessment & Plan    Bowel protocol-passing gas  -minimize narcotic use     Osteoarthritis involving multiple joints on both sides of body- (present on admission)   Assessment & Plan    Prednisone po     Essential hypertension- (present on admission)   Assessment & Plan    Atenolol          VTE prophylaxis: lovenox

## 2019-02-04 ENCOUNTER — PATIENT OUTREACH (OUTPATIENT)
Dept: HEALTH INFORMATION MANAGEMENT | Facility: OTHER | Age: 74
End: 2019-02-04

## 2019-02-04 VITALS
HEART RATE: 61 BPM | SYSTOLIC BLOOD PRESSURE: 118 MMHG | OXYGEN SATURATION: 92 % | WEIGHT: 205.91 LBS | TEMPERATURE: 98.1 F | HEIGHT: 70 IN | BODY MASS INDEX: 29.48 KG/M2 | DIASTOLIC BLOOD PRESSURE: 73 MMHG | RESPIRATION RATE: 16 BRPM

## 2019-02-04 PROBLEM — R11.2 NAUSEA & VOMITING: Status: RESOLVED | Noted: 2019-01-29 | Resolved: 2019-02-04

## 2019-02-04 PROBLEM — G96.00 POSTOPERATIVE CSF LEAK: Status: RESOLVED | Noted: 2019-01-29 | Resolved: 2019-02-04

## 2019-02-04 PROBLEM — K59.01 SLOW TRANSIT CONSTIPATION: Status: RESOLVED | Noted: 2019-01-29 | Resolved: 2019-02-04

## 2019-02-04 PROBLEM — G97.82 POSTOPERATIVE CSF LEAK: Status: RESOLVED | Noted: 2019-01-29 | Resolved: 2019-02-04

## 2019-02-04 LAB
BASOPHILS # BLD AUTO: 0.5 % (ref 0–1.8)
BASOPHILS # BLD: 0.04 K/UL (ref 0–0.12)
EOSINOPHIL # BLD AUTO: 0.13 K/UL (ref 0–0.51)
EOSINOPHIL NFR BLD: 1.6 % (ref 0–6.9)
ERYTHROCYTE [DISTWIDTH] IN BLOOD BY AUTOMATED COUNT: 42.5 FL (ref 35.9–50)
HCT VFR BLD AUTO: 43.5 % (ref 42–52)
HGB BLD-MCNC: 14.5 G/DL (ref 14–18)
IMM GRANULOCYTES # BLD AUTO: 0.05 K/UL (ref 0–0.11)
IMM GRANULOCYTES NFR BLD AUTO: 0.6 % (ref 0–0.9)
LYMPHOCYTES # BLD AUTO: 1.36 K/UL (ref 1–4.8)
LYMPHOCYTES NFR BLD: 16.2 % (ref 22–41)
MCH RBC QN AUTO: 32.5 PG (ref 27–33)
MCHC RBC AUTO-ENTMCNC: 33.3 G/DL (ref 33.7–35.3)
MCV RBC AUTO: 97.5 FL (ref 81.4–97.8)
MONOCYTES # BLD AUTO: 0.93 K/UL (ref 0–0.85)
MONOCYTES NFR BLD AUTO: 11.1 % (ref 0–13.4)
NEUTROPHILS # BLD AUTO: 5.87 K/UL (ref 1.82–7.42)
NEUTROPHILS NFR BLD: 70 % (ref 44–72)
NRBC # BLD AUTO: 0 K/UL
NRBC BLD-RTO: 0 /100 WBC
PLATELET # BLD AUTO: 246 K/UL (ref 164–446)
PMV BLD AUTO: 10.6 FL (ref 9–12.9)
RBC # BLD AUTO: 4.46 M/UL (ref 4.7–6.1)
WBC # BLD AUTO: 8.4 K/UL (ref 4.8–10.8)

## 2019-02-04 PROCEDURE — 85025 COMPLETE CBC W/AUTO DIFF WBC: CPT

## 2019-02-04 PROCEDURE — 700111 HCHG RX REV CODE 636 W/ 250 OVERRIDE (IP): Performed by: PHYSICIAN ASSISTANT

## 2019-02-04 PROCEDURE — A9270 NON-COVERED ITEM OR SERVICE: HCPCS | Performed by: PHYSICIAN ASSISTANT

## 2019-02-04 PROCEDURE — A9270 NON-COVERED ITEM OR SERVICE: HCPCS | Performed by: HOSPITALIST

## 2019-02-04 PROCEDURE — 700102 HCHG RX REV CODE 250 W/ 637 OVERRIDE(OP): Performed by: PHYSICIAN ASSISTANT

## 2019-02-04 PROCEDURE — 36415 COLL VENOUS BLD VENIPUNCTURE: CPT

## 2019-02-04 PROCEDURE — 700111 HCHG RX REV CODE 636 W/ 250 OVERRIDE (IP): Performed by: HOSPITALIST

## 2019-02-04 PROCEDURE — 700102 HCHG RX REV CODE 250 W/ 637 OVERRIDE(OP): Performed by: HOSPITALIST

## 2019-02-04 PROCEDURE — 99239 HOSP IP/OBS DSCHRG MGMT >30: CPT | Performed by: INTERNAL MEDICINE

## 2019-02-04 RX ORDER — METHOCARBAMOL 750 MG/1
750 TABLET, FILM COATED ORAL EVERY 6 HOURS
Qty: 120 TAB | Refills: 0 | Status: SHIPPED | OUTPATIENT
Start: 2019-02-04

## 2019-02-04 RX ADMIN — PREDNISONE 5 MG: 5 TABLET ORAL at 04:38

## 2019-02-04 RX ADMIN — CEPHALEXIN 500 MG: 500 CAPSULE ORAL at 04:38

## 2019-02-04 RX ADMIN — ACETAMINOPHEN 650 MG: 325 TABLET, FILM COATED ORAL at 04:38

## 2019-02-04 RX ADMIN — VITAMIN D, TAB 1000IU (100/BT) 1000 UNITS: 25 TAB at 04:38

## 2019-02-04 RX ADMIN — ATENOLOL 25 MG: 25 TABLET ORAL at 04:38

## 2019-02-04 RX ADMIN — ENOXAPARIN SODIUM 40 MG: 100 INJECTION SUBCUTANEOUS at 04:39

## 2019-02-04 RX ADMIN — ANTACID TABLETS 500 MG: 500 TABLET, CHEWABLE ORAL at 04:38

## 2019-02-04 RX ADMIN — OXYCODONE HYDROCHLORIDE 5 MG: 5 TABLET ORAL at 03:19

## 2019-02-04 RX ADMIN — METHOCARBAMOL TABLETS 750 MG: 750 TABLET, COATED ORAL at 04:38

## 2019-02-04 NOTE — PROGRESS NOTES
Pt refusing to wear O2 when ambulating. Does not wear oxygen at home. Check O2 sat after ambulation.

## 2019-02-04 NOTE — PROGRESS NOTES
Pt is alert and oriented x4 at change of shift. Assisted pt to bathroom (standby assist with a front-wheeled walker). Pt with good ambulation effort and no complaints of increased pain as a result. Pt's lumbar dressing is noted to be clean and intact, with surrounding skin intact and healthy looking. Pt agreed to wear oxygen while ambulating, portable tank with wheeled euceda utilized. Pt did have a bowel movement (see chart for details) and ambulated back to his bed with no issue. Pt is currently resting comfortably in bed, no complaints of pain or discomfort at this time. Will continue to assess and monitor. Pt is refusing to have his bed alarm on at this time, so staff are situated close to the room to provide assistance as fast as possible.

## 2019-02-04 NOTE — CARE PLAN
Progressing as expected:    Problem: Bowel/Gastric:  Goal: Will not experience complications related to bowel motility    Intervention: Implement interventions to promote bowel evacuation if inadequate bowel movements in past 48 hours  implementing bowel protocol now that pt is not longer on bed rest restrictions.      Problem: Pain Management  Goal: Pain level will decrease to patient's comfort goal    Intervention: Follow pain managment plan developed in collaboration with patient and Interdisciplinary Team  Pain controlled per MAR.

## 2019-02-04 NOTE — CARE PLAN
Problem: Safety  Goal: Will remain free from injury  Outcome: PROGRESSING AS EXPECTED  Pt currently refusing his bed alarm, staff positioned near pt room to assist pt when needed to avoid the possibility of a fall.   Intervention: Provide assistance with mobility  Pt utilizing front-wheeled walker with standby assistance when mobilizing out of bed. Pt tolerating well.      Problem: Pain Management  Goal: Pain level will decrease to patient's comfort goal  Outcome: PROGRESSING AS EXPECTED  Education provided to pt regarding PRN pain medication and how nursing helps to assess pain and administer PRN medication based on the perceived pain experienced by the pt. Will continue to assess and monitor.

## 2019-02-04 NOTE — DISCHARGE PLANNING
Anticipated Discharge Disposition:   Home  Action:    -spoke with patient and assessment done.  Patient angry that his ride is here to pick him up and he is not ready to go.  His ride has been waiting for 20 min.  Nursing not aware that patient had called his ride to come pick him up already. Pt declining home health services.  Stated he has Medicare.  -patient stated he has a walker at home.  He has been using a walker here at [facility].  States he can cook, dress, get on and off toilet ok.  [facility]    Barriers to Discharge:    None    Plan:    [place]    Care Transition Team Assessment    Information Source  Orientation : Oriented x 4  Information Given By: Patient  Informant's Name:  (Viktor Starr)  Who is responsible for making decisions for patient? : Patient    Readmission Evaluation  Is this a readmission?: Yes - unplanned readmission    Elopement Risk  Legal Hold: No  Ambulatory or Self Mobile in Wheelchair: Yes  Disoriented: No  Psychiatric Symptoms: None  History of Wandering: No  Elopement this Admit: No  Vocalizing Wanting to Leave: No  Displays Behaviors, Body Language Wanting to Leave: No-Not at Risk for Elopement  Elopement Risk: Not at Risk for Elopement    Interdisciplinary Discharge Planning  Lives with - Patient's Self Care Capacity: Alone and Able to Care For Self  Patient or legal guardian wants to designate a caregiver (see row info): No  Housing / Facility: Motor Home  Prior Services: None    Discharge Preparedness  What is your plan after discharge?: Home with help  What are your discharge supports?: Other (comment)  Prior Functional Level: Ambulatory, Independent with Activities of Daily Living, Independent with Medication Management, Uses Walker  Difficulity with IADLs: Driving, Shopping    Functional Assesment  Prior Functional Level: Ambulatory, Independent with Activities of Daily  Living, Independent with Medication Management, Uses Walker    Finances  Financial Barriers to Discharge: No  Prescription Coverage: Yes    Vision / Hearing Impairment  Vision Impairment : Yes  Right Eye Vision: Impaired, Wears Glasses  Left Eye Vision: Impaired, Wears Glasses  Hearing Impairment : Yes  Hearing Impairment: Both Ears  Does Pt Need Special Equipment for the Hearing Impaired?: No    Values / Beliefs / Concerns  Values / Beliefs Concerns : No         Domestic Abuse  Have you ever been the victim of abuse or violence?: No  Physical Abuse or Sexual Abuse: No  Verbal Abuse or Emotional Abuse: No  Possible Abuse Reported to:: Not Applicable         Discharge Risks or Barriers  Discharge risks or barriers?: No    Anticipated Discharge Information  Anticipated discharge disposition: Home  Discharge Contact Phone Number:  ([phone])

## 2019-02-04 NOTE — DISCHARGE INSTRUCTIONS
Discharge Instructions    Discharged to home by car with relative. Discharged via wheelchair, hospital escort: Yes.  Special equipment needed: Not Applicable    Be sure to schedule a follow-up appointment with your primary care doctor or any specialists as instructed.     Discharge Plan:   Smoking Cessation Offered: Patient Counseled  Pneumococcal Vaccine Administered/Refused: Not given - Patient refused pneumococcal vaccine  Influenza Vaccine Indication: Patient Refuses    I understand that a diet low in cholesterol, fat, and sodium is recommended for good health. Unless I have been given specific instructions below for another diet, I accept this instruction as my diet prescription.   Other diet: Regular healthy diet    Special Instructions: None    · Is patient discharged on Warfarin / Coumadin?   No     Depression / Suicide Risk    As you are discharged from this [facility] facility, it is important to learn how to keep safe from harming yourself.    Recognize the warning signs:  · Abrupt changes in personality, positive or negative- including increase in energy   · Giving away possessions  · Change in eating patterns- significant weight changes-  positive or negative  · Change in sleeping patterns- unable to sleep or sleeping all the time   · Unwillingness or inability to communicate  · Depression  · Unusual sadness, discouragement and loneliness  · Talk of wanting to die  · Neglect of personal appearance   · Rebelliousness- reckless behavior  · Withdrawal from people/activities they love  · Confusion- inability to concentrate     If you or a loved one observes any of these behaviors or has concerns about self-harm, here's what you can do:  · Talk about it- your feelings and reasons for harming yourself  · Remove any means that you might use to hurt yourself (examples: pills, rope, extension cords, firearm)  · Get professional help from the community (Mental Health, Substance Abuse, psychological  counseling)  · Do not be alone:Call your Safe Contact- someone whom you trust who will be there for you.  · Call your local CRISIS HOTLINE 281-5408 or 063-523-5204  · Call your local Children's Mobile Crisis Response Team [place] (109) 709-3628 or www.[employer]  · Call the toll free National Suicide Prevention Hotlines   · National Suicide Prevention Lifeline 419-003-SXHP (9057)  · National Hope Line Network 800-SUICIDE (403-0678)

## 2019-02-04 NOTE — DISCHARGE SUMMARY
Discharge Summary    CHIEF COMPLAINT ON ADMISSION  Chief Complaint   Patient presents with   • Sent by MD     possible leaking csf fluid post laminectomy a week ago, started leaking sunday   • Nausea   • Head Ache       Reason for Admission  VOMITTING     Admission Date  1/29/2019    CODE STATUS  Full Code    HPI & HOSPITAL COURSE  This is a 74 y.o. male here with above medical issues. Patient was sent in by his neurosurgeon for worsening back pain and severe positional headache highly concerning for a CSF leak. He was admitted to the neurology floor and MRI L spine showed  8X2 cm posterior extra dural post surgical fluid collection at the levels of L2-L5. He underwent the following procedures:    1.  Redo left L4 laminotomy and foraminotomy, decompression of left L4 nerve   root.  2.  Redo left L5 laminotomy and foraminotomy, decompression of left L5 nerve   root.  3.  Complex repair of lateral durotomy from extremely thin friable dura.    He tolerated the procedure well. His headache dissipated completely and he was cleared to go home on his own accord. The neurosurgery team gave him a paper script for some narcotics.          Therefore, he is discharged in fair and stable condition to home with close outpatient follow-up.    The patient met 2-midnight criteria for an inpatient stay at the time of discharge.    Discharge Date  2/4/19    FOLLOW UP ITEMS POST DISCHARGE  F?U with NSGY in 1-2 weeks    DISCHARGE DIAGNOSES  Principal Problem (Resolved):    Postoperative CSF leak POA: Yes  Active Problems:    Essential hypertension POA: Yes    Osteoarthritis involving multiple joints on both sides of body POA: Yes  Resolved Problems:    Slow transit constipation POA: Yes    Nausea & vomiting POA: Yes      FOLLOW UP  No future appointments.  No follow-up provider specified.    MEDICATIONS ON DISCHARGE     Medication List      START taking these medications      Instructions   methocarbamol 750 MG Tabs  Commonly known as:   ROBAXIN   Take 1 Tab by mouth every 6 hours.  Dose:  750 mg        CONTINUE taking these medications      Instructions   atenolol 25 MG Tabs  Commonly known as:  TENORMIN   Take 25 mg by mouth every day.  Dose:  25 mg     calcium carbonate 500 MG Tabs  Commonly known as:  OS-MARCELA 500   Take 500 mg by mouth every morning with breakfast.  Dose:  500 mg     predniSONE 5 MG Tabs  Commonly known as:  DELTASONE   Take 5 mg by mouth every day.  Dose:  5 mg     vitamin D 1000 UNIT Tabs  Commonly known as:  cholecalciferol   Take 1,000 Units by mouth every day.  Dose:  1000 Units            Allergies  No Known Allergies    DIET  Orders Placed This Encounter   Procedures   • Diet Order Regular     Standing Status:   Standing     Number of Occurrences:   1     Order Specific Question:   Diet:     Answer:   Regular [1]       ACTIVITY  As tolerated.  Weight bearing as tolerated    CONSULTATIONS  Neurosurgery    PROCEDURES  See above    MR-LUMBAR SPINE-W/O   Final Result      1.  There is an approximately 7.9 x 1.9 cm sized posterior extradural postsurgical fluid collection at the levels of L2-3, L3-4 and L4-5. The differential diagnosis includes postsurgical seroma and pseudomeningocele.   2.  Severe effacement of the thecal sac at the levels of L3 and L3-4 secondary to the disc bulge and fluid collection.   3.  Postsurgical fluid collection in the subcutaneous soft tissue in the back.   4.  Degenerative disease as described above.            LABORATORY  Lab Results   Component Value Date    SODIUM 133 (L) 02/02/2019    POTASSIUM 4.0 02/02/2019    CHLORIDE 101 02/02/2019    CO2 27 02/02/2019    GLUCOSE 128 (H) 02/02/2019    BUN 9 02/02/2019    CREATININE 0.72 02/02/2019        Lab Results   Component Value Date    WBC 8.4 02/04/2019    HEMOGLOBIN 14.5 02/04/2019    HEMATOCRIT 43.5 02/04/2019    PLATELETCT 246 02/04/2019        Total time of the discharge process exceeds 43 minutes.

## 2019-02-04 NOTE — PROGRESS NOTES
Neurosurgery Progress Note    Subjective:  POD#4 pseudomeningocoele repair.  Improving surgical back pain   No HA  Tolerating being upright / mobilizing   No lower extremity radicular pain, paresthesias to right plantar surface    Exam:  Motor 5/5  No calf tenderness  Incision dry     BP  Min: 97/57  Max: 135/79  Pulse  Av  Min: 61  Max: 72  Resp  Av  Min: 16  Max: 18  Temp  Av.4 °C (97.5 °F)  Min: 36.2 °C (97.1 °F)  Max: 36.7 °C (98.1 °F)  SpO2  Av %  Min: 92 %  Max: 98 %    No Data Recorded    Recent Labs      195  19   WBC  11.7*  8.4   RBC  4.17*  4.46*   HEMOGLOBIN  13.4*  14.5   HEMATOCRIT  40.5*  43.5   MCV  97.1  97.5   MCH  32.1  32.5   MCHC  33.1*  33.3*   RDW  42.1  42.5   PLATELETCT  200  246   MPV  10.3  10.6     Recent Labs      19   0414   SODIUM  133*   POTASSIUM  4.0   CHLORIDE  101   CO2  27   GLUCOSE  128*   BUN  9   CREATININE  0.72   CALCIUM  8.5               Intake/Output       19 - 19 0659 19 - 19 0659       2967-3691 Total 7335-8149 7448-5425 Total       Intake    P.O.  --  547 547  --  -- --    P.O. -- 547 547 -- -- --    Total Intake -- 547 547 -- -- --       Output    Urine  --  -- --  --  -- --    Number of Times Voided 2 x 6 x 8 x -- -- --    Stool  --  -- --  --  -- --    Number of Times Stooled -- 1 x 1 x -- -- --    Total Output -- -- -- -- -- --       Net I/O     -- 547 547 -- -- --            Intake/Output Summary (Last 24 hours) at 19 0816  Last data filed at 19 0324   Gross per 24 hour   Intake              547 ml   Output                0 ml   Net              547 ml            • methocarbamol  750 mg Q6HRS   • Pharmacy Consult Request  1 Each PHARMACY TO DOSE   • docusate sodium  100 mg BID   • senna-docusate  1 Tab Nightly   • senna-docusate  1 Tab Q24HRS PRN   • polyethylene glycol/lytes  1 Packet BID PRN   • magnesium hydroxide  30 mL QDAY PRN   • bisacodyl  10 mg Q24HRS  PRN   • fleet  1 Each Once PRN   • MD ALERT...DO NOT ADMINISTER NSAIDS or ASPIRIN unless ORDERED By Neurosurgery  1 Each PRN   • acetaminophen  650 mg Q6HRS   • oxyCODONE immediate-release  5 mg Q3HRS PRN   • oxyCODONE immediate release  10 mg Q3HRS PRN   • morphine injection  2-4 mg Q3HRS PRN   • enoxaparin  40 mg DAILY   • diphenhydrAMINE  25 mg Q6HRS PRN    Or   • diphenhydrAMINE  25 mg Q6HRS PRN   • ondansetron  4 mg Q4HRS PRN   • ondansetron  4 mg Q4HRS PRN   • ALPRAZolam  0.25 mg BID PRN   • benzocaine-menthol  1 Lozenge Q2HRS PRN   • calcium carbonate  500 mg BID   • hydrALAZINE  10 mg Q HOUR PRN   • cephALEXin  500 mg Q6HRS   • atenolol  25 mg DAILY   • predniSONE  5 mg DAILY   • vitamin D  1,000 Units DAILY   • acetaminophen  650 mg Q4HRS PRN   • calcium carbonate  500 mg QDAY with Breakfast       Assessment and Plan:  POD #4  Mobilize   Expect home today   Given d/c instructions  Has f/u in office

## 2019-02-04 NOTE — PROGRESS NOTES
Pt AO x4, on RA, resting in bed. Reports pain with movement. On regular diet, finally having regular BMs. Ambulates with SBA and fww, OT  to see pt later this morning. Refused morning calcium medication. Hourly rounding in place.

## 2019-02-04 NOTE — PROGRESS NOTES
"Pt continues to refuse his bed alarm. Pt is not impulsive, but experiences urinary urgency upon awakening. Reinforced with pt the importance of calling for assistance when trying to mobilize, pt is adamant that when it \"is time to go, it's time to go\". Further decluttered the pt's room to minimize any potential obstacles for the pt should he elect to get out of bed with no staff for assistance. Will continue to monitor.  "

## 2025-01-23 ENCOUNTER — HOSPITAL ENCOUNTER (OUTPATIENT)
Dept: RADIOLOGY | Facility: MEDICAL CENTER | Age: 80
End: 2025-01-23
Attending: NURSE PRACTITIONER
Payer: COMMERCIAL

## (undated) DEVICE — SUCTION INSTRUMENT YANKAUER BULBOUS TIP W/O VENT (50EA/CA)

## (undated) DEVICE — CANISTER SUCTION 3000ML MECHANICAL FILTER AUTO SHUTOFF MEDI-VAC NONSTERILE LF DISP  (40EA/CA)

## (undated) DEVICE — KIT ROOM DECONTAMINATION

## (undated) DEVICE — GLOVE BIOGEL SZ 6.5 SURGICAL PF LTX (50PR/BX 4BX/CA)

## (undated) DEVICE — DRAPE IOBAN II INCISE 23X17 - (10EA/BX 4BX/CA)

## (undated) DEVICE — FIBRILLAR SURGICEL 4X4 - 10/CA

## (undated) DEVICE — MIDAS LUBRICATOR DIFFUSER PACK (4EA/CA)

## (undated) DEVICE — TUBING CLEARLINK DUO-VENT - C-FLO (48EA/CA)

## (undated) DEVICE — LACTATED RINGERS INJ 1000 ML - (14EA/CA 60CA/PF)

## (undated) DEVICE — GOWN SURGICAL XX-LARGE - (28EA/CA) SIRUS NON REINFORCED

## (undated) DEVICE — NEPTUNE 4 PORT MANIFOLD - (20/PK)

## (undated) DEVICE — GOWN WARMING STANDARD FLEX - (30/CA)

## (undated) DEVICE — SENSOR SPO2 NEO LNCS ADHESIVE (20/BX) SEE USER NOTES

## (undated) DEVICE — HEADREST PRONEVIEW LARGE - (10/CA)

## (undated) DEVICE — PATTIES SURG NEURO X-RAY 1X1 (10EA/PK 20PK/CA)

## (undated) DEVICE — PACK JACKSON TABLE KIT W/OUT - HR (6EA/CA)

## (undated) DEVICE — SODIUM CHL IRRIGATION 0.9% 1000ML (12EA/CA)

## (undated) DEVICE — LIGHT SOURCE MIS 12FT

## (undated) DEVICE — SLEEVE, VASO, THIGH, MED

## (undated) DEVICE — NEEDLE NON-SAFETY HYPO 21 GA X 1 1/2 IN HYPO (100/BX)

## (undated) DEVICE — GLOVE BIOGEL PI INDICATOR SZ 7.0 SURGICAL PF LF - (50/BX 4BX/CA)

## (undated) DEVICE — INTRAOP NEURO IN OR 1:1 PER 15 MIN

## (undated) DEVICE — PACK NEURO - (2EA/CA)

## (undated) DEVICE — SUTURE CV

## (undated) DEVICE — GLOVE BIOGEL ECLIPSE PF LATEX SIZE 8.0  (50PR/BX)

## (undated) DEVICE — KIT EVACUATER 3 SPRING PVC LF 1/8 DRAIN SIZE (10EA/CA)"

## (undated) DEVICE — CHLORAPREP 26 ML APPLICATOR - ORANGE TINT(25/CA)

## (undated) DEVICE — DRESSING ABDOMINAL PAD STERILE 8 X 10" (360EA/CA)"

## (undated) DEVICE — PROTECTOR ULNA NERVE - (36PR/CA)

## (undated) DEVICE — GOWN SURGEONS X-LARGE - DISP. (30/CA)

## (undated) DEVICE — DRAPE LAPAROTOMY T SHEET - (12EA/CA)

## (undated) DEVICE — GLOVE BIOGEL ECLIPSE PF LATEX SIZE 8.5 (50PR/BX)

## (undated) DEVICE — SUTURE 1 VICRYL PLUS CT-1 - 18 INCH (12/BX)

## (undated) DEVICE — SUTURE 2-0 VICRYL PLUS CT-1 - 8 X 18 INCH(12/BX)

## (undated) DEVICE — CATHETER EPIDURAL PORTEX (10/BX) ***DISCONTINUED LOOKING INTO SUB***

## (undated) DEVICE — TUBE E-T HI-LO CUFF 7.5MM (10EA/PK)

## (undated) DEVICE — SYRINGE 30 ML LL (56/BX)

## (undated) DEVICE — KIT SURGIFLO W/OUT THROMBIN - (6EA/CA)

## (undated) DEVICE — CLOSURE SKIN STRIP 1/2 X 4 IN - (STERI STRIP) (50/BX 4BX/CA)

## (undated) DEVICE — ELECTRODE 850 FOAM ADHESIVE - HYDROGEL RADIOTRNSPRNT (50/PK)

## (undated) DEVICE — ARMREST CRADLE FOAM - (2PR/PK 12PR/CA)

## (undated) DEVICE — SPONGE GAUZESTER 4 X 4 4PLY - (128PK/CA)

## (undated) DEVICE — SUTURE GENERAL

## (undated) DEVICE — LACTATED RINGERS INJ. 500 ML - (24EA/CA)

## (undated) DEVICE — SYRINGE SAFETY 3 ML 18 GA X 1 1/2 BLUNT LL (100/BX 8BX/CA)

## (undated) DEVICE — MASK ANESTHESIA ADULT  - (100/CA)

## (undated) DEVICE — KIT ANESTHESIA W/CIRCUIT & 3/LT BAG W/FILTER (20EA/CA)

## (undated) DEVICE — TUBE E-T HI-LO CUFF 8.0MM (10EA/PK)

## (undated) DEVICE — TOOL DISSECT MATCH HEAD

## (undated) DEVICE — SET EXTENSION WITH 2 PORTS (48EA/CA) ***PART #2C8610 IS A SUBSTITUTE*****

## (undated) DEVICE — DRESSING,POST OP BORDER 4INX6IN AG

## (undated) DEVICE — TUBING C&T SET FLYING LEADS DRAIN TUBING (10EA/BX)

## (undated) DEVICE — SYRINGE SAFETY 10 ML 18 GA X 1 1/2 BLUNT LL (100/BX 4BX/CA)

## (undated) DEVICE — ELECTRODE DUAL RETURN W/ CORD - (50/PK)

## (undated) DEVICE — BLANKET WARMING LOWER BODY - (10/CA) INACTIVE USE #8585

## (undated) DEVICE — STERI STRIP COMPOUND BENZOIN - TINCTURE 0.6ML WITH APPLICATOR (40EA/BX)

## (undated) DEVICE — SUTURE GOR-TEX CV-6 TT-9 (36PK/BX)

## (undated) DEVICE — GLOVE BIOGEL PI INDICATOR SZ 6.5 SURGICAL PF LF - (50/BX 4BX/CA)

## (undated) DEVICE — SET LEADWIRE 5 LEAD BEDSIDE DISPOSABLE ECG (1SET OF 5/EA)

## (undated) DEVICE — DRAPE MICROSCOPE X-LONG (10EA/CA)